# Patient Record
Sex: MALE | Race: WHITE | NOT HISPANIC OR LATINO | Employment: UNEMPLOYED | URBAN - METROPOLITAN AREA
[De-identification: names, ages, dates, MRNs, and addresses within clinical notes are randomized per-mention and may not be internally consistent; named-entity substitution may affect disease eponyms.]

---

## 2023-03-28 ENCOUNTER — TELEPHONE (OUTPATIENT)
Dept: PRIMARY CARE | Facility: CLINIC | Age: 58
End: 2023-03-28
Payer: COMMERCIAL

## 2023-03-28 RX ORDER — GLIMEPIRIDE 4 MG/1
4 TABLET ORAL DAILY
COMMUNITY
End: 2023-05-09 | Stop reason: SDUPTHER

## 2023-03-28 RX ORDER — ATORVASTATIN CALCIUM 80 MG/1
TABLET, FILM COATED ORAL
COMMUNITY
Start: 2022-06-24 | End: 2023-06-12 | Stop reason: ALTCHOICE

## 2023-03-28 RX ORDER — ESCITALOPRAM OXALATE 10 MG/1
TABLET ORAL
COMMUNITY
Start: 2023-02-27 | End: 2023-05-09 | Stop reason: ALTCHOICE

## 2023-03-28 RX ORDER — NITROGLYCERIN 0.4 MG/1
TABLET SUBLINGUAL
COMMUNITY
Start: 2021-11-19

## 2023-03-28 RX ORDER — ISOSORBIDE MONONITRATE 30 MG/1
30 TABLET, EXTENDED RELEASE ORAL DAILY
COMMUNITY
Start: 2022-05-02

## 2023-03-28 RX ORDER — MULTIVITAMIN
1 TABLET ORAL DAILY
COMMUNITY
Start: 2020-11-02 | End: 2023-05-09

## 2023-03-28 RX ORDER — AMLODIPINE BESYLATE 10 MG/1
TABLET ORAL
COMMUNITY
Start: 2022-06-24 | End: 2023-05-09 | Stop reason: ALTCHOICE

## 2023-03-28 RX ORDER — LISINOPRIL 40 MG/1
40 TABLET ORAL DAILY
COMMUNITY
End: 2023-05-02

## 2023-03-28 RX ORDER — HYDROXYZINE HYDROCHLORIDE 50 MG/1
TABLET, FILM COATED ORAL
COMMUNITY
End: 2023-05-31

## 2023-03-28 RX ORDER — MELOXICAM 15 MG/1
TABLET ORAL
COMMUNITY
Start: 2023-02-27 | End: 2023-04-19

## 2023-03-28 RX ORDER — ASPIRIN 81 MG/1
81 TABLET ORAL DAILY
COMMUNITY
End: 2023-05-02

## 2023-03-28 RX ORDER — CLOPIDOGREL BISULFATE 75 MG/1
75 TABLET ORAL DAILY
COMMUNITY
End: 2023-05-02

## 2023-03-28 RX ORDER — DAPAGLIFLOZIN 10 MG/1
10 TABLET, FILM COATED ORAL DAILY
COMMUNITY
End: 2023-04-19

## 2023-03-28 RX ORDER — TRIAMCINOLONE ACETONIDE OINTMENT USP, 0.05% 0.5 MG/G
OINTMENT TOPICAL
COMMUNITY
End: 2023-08-21 | Stop reason: SDUPTHER

## 2023-03-28 RX ORDER — BUPROPION HYDROCHLORIDE 100 MG/1
1 TABLET, EXTENDED RELEASE ORAL 2 TIMES DAILY
COMMUNITY
Start: 2023-03-15 | End: 2023-05-09

## 2023-03-28 RX ORDER — NEOMYCIN SULFATE, POLYMYXIN B SULFATE, HYDROCORTISONE 3.5; 10000; 1 MG/ML; [USP'U]/ML; MG/ML
SOLUTION/ DROPS AURICULAR (OTIC) 4 TIMES DAILY
COMMUNITY
Start: 2022-07-28 | End: 2023-05-09

## 2023-03-28 RX ORDER — ROSUVASTATIN CALCIUM 20 MG/1
20 TABLET, COATED ORAL DAILY
COMMUNITY
End: 2023-05-02

## 2023-03-28 RX ORDER — CHLORHEXIDINE GLUCONATE ORAL RINSE 1.2 MG/ML
SOLUTION DENTAL
COMMUNITY
Start: 2022-05-02 | End: 2023-05-09

## 2023-03-28 RX ORDER — SERTRALINE HYDROCHLORIDE 100 MG/1
1 TABLET, FILM COATED ORAL DAILY
COMMUNITY
Start: 2022-12-07 | End: 2023-05-09

## 2023-03-28 RX ORDER — LEVOTHYROXINE SODIUM 150 UG/1
150 TABLET ORAL DAILY
COMMUNITY
End: 2023-05-02

## 2023-03-28 RX ORDER — METOPROLOL SUCCINATE 100 MG/1
100 TABLET, EXTENDED RELEASE ORAL NIGHTLY
COMMUNITY
Start: 2023-02-27 | End: 2023-04-19

## 2023-03-28 RX ORDER — GABAPENTIN 100 MG/1
CAPSULE ORAL
COMMUNITY
Start: 2023-02-28 | End: 2023-05-02

## 2023-03-28 RX ORDER — TRAZODONE HYDROCHLORIDE 50 MG/1
50 TABLET ORAL NIGHTLY
COMMUNITY
Start: 2022-12-07

## 2023-03-28 NOTE — TELEPHONE ENCOUNTER
Pt having dental work done next week and dentist would like for him to go off blood thinners. How long can he go ?

## 2023-04-19 DIAGNOSIS — G89.29 OTHER CHRONIC PAIN: ICD-10-CM

## 2023-04-19 DIAGNOSIS — E11.10 DM (DIABETES MELLITUS) TYPE 2, UNCONTROLLED, WITH KETOACIDOSIS (MULTI): Primary | ICD-10-CM

## 2023-04-19 DIAGNOSIS — I10 ESSENTIAL (PRIMARY) HYPERTENSION: ICD-10-CM

## 2023-04-19 DIAGNOSIS — M25.512 PAIN IN LEFT SHOULDER: ICD-10-CM

## 2023-04-19 RX ORDER — DAPAGLIFLOZIN 10 MG/1
TABLET, FILM COATED ORAL
Qty: 30 TABLET | Refills: 1 | Status: SHIPPED | OUTPATIENT
Start: 2023-04-19 | End: 2023-05-31

## 2023-04-19 RX ORDER — METOPROLOL SUCCINATE 100 MG/1
TABLET, EXTENDED RELEASE ORAL
Qty: 90 TABLET | Refills: 1 | Status: SHIPPED | OUTPATIENT
Start: 2023-04-19 | End: 2023-05-09 | Stop reason: SDUPTHER

## 2023-04-19 RX ORDER — MELOXICAM 15 MG/1
TABLET ORAL
Qty: 30 TABLET | Refills: 1 | Status: SHIPPED | OUTPATIENT
Start: 2023-04-19 | End: 2023-05-02 | Stop reason: ALTCHOICE

## 2023-05-01 DIAGNOSIS — M54.9 DORSALGIA, UNSPECIFIED: ICD-10-CM

## 2023-05-01 DIAGNOSIS — E03.9 HYPOTHYROIDISM, UNSPECIFIED: ICD-10-CM

## 2023-05-01 DIAGNOSIS — I25.10 ATHEROSCLEROTIC HEART DISEASE OF NATIVE CORONARY ARTERY WITHOUT ANGINA PECTORIS: ICD-10-CM

## 2023-05-01 DIAGNOSIS — E11.49 TYPE 2 DIABETES MELLITUS WITH OTHER DIABETIC NEUROLOGICAL COMPLICATION (MULTI): ICD-10-CM

## 2023-05-01 DIAGNOSIS — G89.29 OTHER CHRONIC PAIN: ICD-10-CM

## 2023-05-01 DIAGNOSIS — I10 ESSENTIAL (PRIMARY) HYPERTENSION: ICD-10-CM

## 2023-05-02 DIAGNOSIS — E11.49 TYPE 2 DIABETES MELLITUS WITH OTHER DIABETIC NEUROLOGICAL COMPLICATION (MULTI): ICD-10-CM

## 2023-05-02 RX ORDER — ROSUVASTATIN CALCIUM 20 MG/1
TABLET, COATED ORAL
Qty: 10 TABLET | Refills: 0 | Status: SHIPPED | OUTPATIENT
Start: 2023-05-02 | End: 2023-05-09 | Stop reason: SDUPTHER

## 2023-05-02 RX ORDER — LISINOPRIL 40 MG/1
TABLET ORAL
Qty: 90 TABLET | Refills: 0 | Status: SHIPPED | OUTPATIENT
Start: 2023-05-02 | End: 2023-08-21 | Stop reason: SDUPTHER

## 2023-05-02 RX ORDER — LEVOTHYROXINE SODIUM 150 UG/1
TABLET ORAL
Qty: 90 TABLET | Refills: 0 | Status: SHIPPED | OUTPATIENT
Start: 2023-05-02 | End: 2023-08-21 | Stop reason: SDUPTHER

## 2023-05-02 RX ORDER — GABAPENTIN 100 MG/1
CAPSULE ORAL
Qty: 540 CAPSULE | Refills: 0 | Status: SHIPPED | OUTPATIENT
Start: 2023-05-02 | End: 2023-07-23 | Stop reason: SDUPTHER

## 2023-05-02 RX ORDER — ASPIRIN 81 MG/1
TABLET ORAL
Qty: 90 TABLET | Refills: 3 | Status: SHIPPED | OUTPATIENT
Start: 2023-05-02

## 2023-05-02 RX ORDER — ROSUVASTATIN CALCIUM 20 MG/1
TABLET, COATED ORAL
Qty: 1 TABLET | Refills: 0 | Status: SHIPPED | OUTPATIENT
Start: 2023-05-02 | End: 2023-05-02

## 2023-05-02 RX ORDER — CLOPIDOGREL BISULFATE 75 MG/1
TABLET ORAL
Qty: 90 TABLET | Refills: 3 | Status: SHIPPED | OUTPATIENT
Start: 2023-05-02

## 2023-05-09 ENCOUNTER — OFFICE VISIT (OUTPATIENT)
Dept: PRIMARY CARE | Facility: CLINIC | Age: 58
End: 2023-05-09
Payer: COMMERCIAL

## 2023-05-09 ENCOUNTER — LAB (OUTPATIENT)
Dept: LAB | Facility: LAB | Age: 58
End: 2023-05-09
Payer: COMMERCIAL

## 2023-05-09 VITALS
SYSTOLIC BLOOD PRESSURE: 130 MMHG | HEIGHT: 72 IN | DIASTOLIC BLOOD PRESSURE: 92 MMHG | HEART RATE: 83 BPM | BODY MASS INDEX: 31.15 KG/M2 | OXYGEN SATURATION: 95 % | RESPIRATION RATE: 18 BRPM | WEIGHT: 230 LBS

## 2023-05-09 DIAGNOSIS — E11.9 CONTROLLED TYPE 2 DIABETES MELLITUS WITHOUT COMPLICATION, UNSPECIFIED WHETHER LONG TERM INSULIN USE (MULTI): Primary | ICD-10-CM

## 2023-05-09 DIAGNOSIS — E11.49 TYPE 2 DIABETES MELLITUS WITH OTHER DIABETIC NEUROLOGICAL COMPLICATION (MULTI): ICD-10-CM

## 2023-05-09 DIAGNOSIS — E11.9 TYPE 2 DIABETES MELLITUS WITHOUT COMPLICATION, UNSPECIFIED WHETHER LONG TERM INSULIN USE (MULTI): ICD-10-CM

## 2023-05-09 DIAGNOSIS — Z91.199 NONCOMPLIANCE: ICD-10-CM

## 2023-05-09 DIAGNOSIS — R06.02 SHORT OF BREATH ON EXERTION: ICD-10-CM

## 2023-05-09 DIAGNOSIS — E11.9 DIABETES MELLITUS TYPE 2 WITHOUT RETINOPATHY (MULTI): ICD-10-CM

## 2023-05-09 DIAGNOSIS — E11.9 CONTROLLED TYPE 2 DIABETES MELLITUS WITHOUT COMPLICATION, WITHOUT LONG-TERM CURRENT USE OF INSULIN (MULTI): ICD-10-CM

## 2023-05-09 DIAGNOSIS — I10 ESSENTIAL (PRIMARY) HYPERTENSION: ICD-10-CM

## 2023-05-09 DIAGNOSIS — E11.9 CONTROLLED TYPE 2 DIABETES MELLITUS WITHOUT COMPLICATION, UNSPECIFIED WHETHER LONG TERM INSULIN USE (MULTI): ICD-10-CM

## 2023-05-09 DIAGNOSIS — R07.9 CHEST PAIN, UNSPECIFIED TYPE: ICD-10-CM

## 2023-05-09 DIAGNOSIS — F31.9 BIPOLAR 1 DISORDER (MULTI): ICD-10-CM

## 2023-05-09 PROBLEM — F41.9 ANXIETY: Status: ACTIVE | Noted: 2023-05-09

## 2023-05-09 PROBLEM — I20.9 AP (ANGINA PECTORIS) (CMS-HCC): Status: ACTIVE | Noted: 2023-05-09

## 2023-05-09 PROBLEM — G89.29 BACK PAIN, CHRONIC: Status: ACTIVE | Noted: 2023-05-09

## 2023-05-09 PROBLEM — N40.1 BENIGN PROSTATIC HYPERPLASIA WITH LOWER URINARY TRACT SYMPTOMS: Status: ACTIVE | Noted: 2019-08-03

## 2023-05-09 PROBLEM — D64.9 ANEMIA: Status: ACTIVE | Noted: 2023-05-09

## 2023-05-09 PROBLEM — R51.9 CHRONIC DAILY HEADACHE: Status: ACTIVE | Noted: 2023-05-09

## 2023-05-09 PROBLEM — I25.10 CAD (CORONARY ARTERY DISEASE), NATIVE CORONARY ARTERY: Status: ACTIVE | Noted: 2023-05-09

## 2023-05-09 PROBLEM — M54.9 BACK PAIN, CHRONIC: Status: ACTIVE | Noted: 2023-05-09

## 2023-05-09 PROCEDURE — 1036F TOBACCO NON-USER: CPT | Performed by: INTERNAL MEDICINE

## 2023-05-09 PROCEDURE — 80053 COMPREHEN METABOLIC PANEL: CPT

## 2023-05-09 PROCEDURE — 3075F SYST BP GE 130 - 139MM HG: CPT | Performed by: INTERNAL MEDICINE

## 2023-05-09 PROCEDURE — 82043 UR ALBUMIN QUANTITATIVE: CPT

## 2023-05-09 PROCEDURE — 84439 ASSAY OF FREE THYROXINE: CPT

## 2023-05-09 PROCEDURE — 84484 ASSAY OF TROPONIN QUANT: CPT

## 2023-05-09 PROCEDURE — 99214 OFFICE O/P EST MOD 30 MIN: CPT | Performed by: INTERNAL MEDICINE

## 2023-05-09 PROCEDURE — 82570 ASSAY OF URINE CREATININE: CPT

## 2023-05-09 PROCEDURE — 80061 LIPID PANEL: CPT

## 2023-05-09 PROCEDURE — 93000 ELECTROCARDIOGRAM COMPLETE: CPT | Performed by: INTERNAL MEDICINE

## 2023-05-09 PROCEDURE — 84443 ASSAY THYROID STIM HORMONE: CPT

## 2023-05-09 PROCEDURE — 85027 COMPLETE CBC AUTOMATED: CPT

## 2023-05-09 PROCEDURE — 4010F ACE/ARB THERAPY RXD/TAKEN: CPT | Performed by: INTERNAL MEDICINE

## 2023-05-09 PROCEDURE — 3080F DIAST BP >= 90 MM HG: CPT | Performed by: INTERNAL MEDICINE

## 2023-05-09 PROCEDURE — 83036 HEMOGLOBIN GLYCOSYLATED A1C: CPT

## 2023-05-09 PROCEDURE — 36415 COLL VENOUS BLD VENIPUNCTURE: CPT

## 2023-05-09 RX ORDER — METOPROLOL SUCCINATE 100 MG/1
TABLET, EXTENDED RELEASE ORAL
Qty: 90 TABLET | Refills: 1 | Status: SHIPPED | OUTPATIENT
Start: 2023-05-09 | End: 2023-05-09 | Stop reason: SDUPTHER

## 2023-05-09 RX ORDER — METOPROLOL SUCCINATE 100 MG/1
TABLET, EXTENDED RELEASE ORAL
Qty: 180 TABLET | Refills: 1 | Status: SHIPPED | OUTPATIENT
Start: 2023-05-09 | End: 2023-11-14 | Stop reason: SDUPTHER

## 2023-05-09 RX ORDER — GLIMEPIRIDE 4 MG/1
4 TABLET ORAL DAILY
Qty: 30 TABLET | Refills: 2 | Status: SHIPPED | OUTPATIENT
Start: 2023-05-09 | End: 2023-06-12 | Stop reason: SDUPTHER

## 2023-05-09 RX ORDER — ROSUVASTATIN CALCIUM 20 MG/1
20 TABLET, COATED ORAL DAILY
Qty: 90 TABLET | Refills: 1 | Status: SHIPPED | OUTPATIENT
Start: 2023-05-09 | End: 2023-06-12 | Stop reason: SDUPTHER

## 2023-05-09 RX ORDER — METOPROLOL SUCCINATE 100 MG/1
TABLET, EXTENDED RELEASE ORAL
Qty: 180 TABLET | Refills: 1 | Status: SHIPPED | OUTPATIENT
Start: 2023-05-09 | End: 2023-05-09 | Stop reason: SDUPTHER

## 2023-05-09 ASSESSMENT — ENCOUNTER SYMPTOMS
ENDOCRINE NEGATIVE: 1
DEPRESSION: 0
MUSCULOSKELETAL NEGATIVE: 1
SHORTNESS OF BREATH: 1
NEUROLOGICAL NEGATIVE: 1
PSYCHIATRIC NEGATIVE: 1
HEMATOLOGIC/LYMPHATIC NEGATIVE: 1
CHEST TIGHTNESS: 1
EYES NEGATIVE: 1
GASTROINTESTINAL NEGATIVE: 1
CONSTITUTIONAL NEGATIVE: 1

## 2023-05-09 ASSESSMENT — PATIENT HEALTH QUESTIONNAIRE - PHQ9
SUM OF ALL RESPONSES TO PHQ9 QUESTIONS 1 AND 2: 0
2. FEELING DOWN, DEPRESSED OR HOPELESS: NOT AT ALL
1. LITTLE INTEREST OR PLEASURE IN DOING THINGS: NOT AT ALL

## 2023-05-09 NOTE — PROGRESS NOTES
Subjective   Patient ID: Maurice Sterling is a 57 y.o. male who presents for Follow-up (Pt here for fuv, complains of SOB and chest pain x 1 week).  HPI      Struggling with  left  chest  arms  muscles  x 1 weeks  ago.  He  has  arthritis of the shoulder  and  has prob  with  lefting  he doesn't take   ntg.   It lasts  2 hours.   Ran out of glimepiride.  1 om    He has  hx of  bipolar.  Was given referral in sept  for  pscyh and he  never went.   He was [facility]  for  3 days: manic  episode. He was  crying a lot and laughing.   He didn't take the meds he got bec  they  gave him  headaches.   He stopped on his own.   He denies drug use.  He stopped 3  dasy he stresses.  Before he was drinking 3 x a week  2-12  beers .  He is  drinking  he cant sleep  he says his  shoudler hurt.     He has  stents from  2022.  Heart doc is     He  gave up weed , Bec of his lungs.   He is stressed about  finances.         Review of Systems   Constitutional: Negative.    HENT: Negative.     Eyes: Negative.    Respiratory:  Positive for chest tightness and shortness of breath.    Cardiovascular:  Positive for chest pain.   Gastrointestinal: Negative.    Endocrine: Negative.    Musculoskeletal: Negative.    Skin: Negative.    Neurological: Negative.    Hematological: Negative.    Psychiatric/Behavioral: Negative.     All other systems reviewed and are negative.      Objective   Physical Exam  Vitals and nursing note reviewed.   Constitutional:       Appearance: Normal appearance.   HENT:      Head: Normocephalic and atraumatic.      Right Ear: Tympanic membrane, ear canal and external ear normal.      Left Ear: Tympanic membrane, ear canal and external ear normal.      Nose: Nose normal.      Mouth/Throat:      Mouth: Mucous membranes are moist.      Pharynx: Oropharynx is clear.   Eyes:      Extraocular Movements: Extraocular movements intact.      Conjunctiva/sclera: Conjunctivae normal.      Pupils: Pupils are equal, round,  and reactive to light.   Cardiovascular:      Rate and Rhythm: Normal rate and regular rhythm.      Pulses: Normal pulses.      Heart sounds: Normal heart sounds.   Pulmonary:      Effort: Pulmonary effort is normal.      Breath sounds: Normal breath sounds.   Abdominal:      General: Abdomen is flat. Bowel sounds are normal.      Palpations: Abdomen is soft.   Musculoskeletal:         General: Normal range of motion.      Cervical back: Neck supple.   Skin:     General: Skin is warm and dry.      Capillary Refill: Capillary refill takes 2 to 3 seconds.   Neurological:      General: No focal deficit present.      Mental Status: He is alert and oriented to person, place, and time. Mental status is at baseline.   Psychiatric:         Mood and Affect: Mood normal.         Behavior: Behavior normal.         Thought Content: Thought content normal.         Judgment: Judgment normal.         Assessment/Plan   Problem List Items Addressed This Visit          Medium    Diabetes mellitus type 2 without retinopathy (CMS/[facility])    Diabetes mellitus type II, controlled (CMS/[facility]) - Primary    Relevant Medications    glimepiride (Amaryl) 4 mg tablet    Other Relevant Orders    Albumin , Urine Random    Hemoglobin A1C    Comprehensive Metabolic Panel    CBC    TSH with reflex to Free T4 if abnormal    Lipid Panel    Albumin , Urine Random    Bipolar 1 disorder (CMS/[facility])     Other Visit Diagnoses       Type 2 diabetes mellitus without complication, unspecified whether long term insulin use (CMS/[facility])        Relevant Orders    Comprehensive Metabolic Panel    CBC    Short of breath on exertion        Relevant Medications    metoprolol succinate XL (Toprol-XL) 100 mg 24 hr tablet    Other Relevant Orders    ECG 12 lead (Completed)    Troponin I, High Sensitivity    Chest pain, unspecified type        Noncompliance        Essential (primary) hypertension        Relevant Medications    metoprolol succinate XL (Toprol-XL) 100 mg 24 hr  tablet    Type 2 diabetes mellitus with other diabetic neurological complication (CMS/HCC)        Relevant Medications    rosuvastatin (Crestor) 20 mg tablet

## 2023-05-10 LAB
ALANINE AMINOTRANSFERASE (SGPT) (U/L) IN SER/PLAS: 36 U/L (ref 10–52)
ALBUMIN (G/DL) IN SER/PLAS: 4.7 G/DL (ref 3.4–5)
ALBUMIN (MG/L) IN URINE: 187.7 MG/L
ALBUMIN/CREATININE (UG/MG) IN URINE: 460 UG/MG CRT (ref 0–30)
ALKALINE PHOSPHATASE (U/L) IN SER/PLAS: 70 U/L (ref 33–120)
ANION GAP IN SER/PLAS: 17 MMOL/L (ref 10–20)
ASPARTATE AMINOTRANSFERASE (SGOT) (U/L) IN SER/PLAS: 27 U/L (ref 9–39)
BILIRUBIN TOTAL (MG/DL) IN SER/PLAS: 0.5 MG/DL (ref 0–1.2)
CALCIUM (MG/DL) IN SER/PLAS: 10.3 MG/DL (ref 8.6–10.6)
CARBON DIOXIDE, TOTAL (MMOL/L) IN SER/PLAS: 28 MMOL/L (ref 21–32)
CHLORIDE (MMOL/L) IN SER/PLAS: 97 MMOL/L (ref 98–107)
CHOLESTEROL (MG/DL) IN SER/PLAS: 378 MG/DL (ref 0–199)
CHOLESTEROL IN HDL (MG/DL) IN SER/PLAS: 39.5 MG/DL
CHOLESTEROL/HDL RATIO: 9.6
CREATININE (MG/DL) IN SER/PLAS: 1.19 MG/DL (ref 0.5–1.3)
CREATININE (MG/DL) IN URINE: 40.8 MG/DL (ref 20–370)
ERYTHROCYTE DISTRIBUTION WIDTH (RATIO) BY AUTOMATED COUNT: 12.8 % (ref 11.5–14.5)
ERYTHROCYTE MEAN CORPUSCULAR HEMOGLOBIN CONCENTRATION (G/DL) BY AUTOMATED: 32.8 G/DL (ref 32–36)
ERYTHROCYTE MEAN CORPUSCULAR VOLUME (FL) BY AUTOMATED COUNT: 87 FL (ref 80–100)
ERYTHROCYTES (10*6/UL) IN BLOOD BY AUTOMATED COUNT: 5.67 X10E12/L (ref 4.5–5.9)
ESTIMATED AVERAGE GLUCOSE FOR HBA1C: 240 MG/DL
GFR MALE: 70 ML/MIN/1.73M2
GLUCOSE (MG/DL) IN SER/PLAS: 266 MG/DL (ref 74–99)
HEMATOCRIT (%) IN BLOOD BY AUTOMATED COUNT: 49.4 % (ref 41–52)
HEMOGLOBIN (G/DL) IN BLOOD: 16.2 G/DL (ref 13.5–17.5)
HEMOGLOBIN A1C/HEMOGLOBIN TOTAL IN BLOOD: 10 %
LDL: ABNORMAL MG/DL (ref 0–99)
LEUKOCYTES (10*3/UL) IN BLOOD BY AUTOMATED COUNT: 5.8 X10E9/L (ref 4.4–11.3)
NRBC (PER 100 WBCS) BY AUTOMATED COUNT: 0 /100 WBC (ref 0–0)
PLATELETS (10*3/UL) IN BLOOD AUTOMATED COUNT: 151 X10E9/L (ref 150–450)
POTASSIUM (MMOL/L) IN SER/PLAS: 4.5 MMOL/L (ref 3.5–5.3)
PROTEIN TOTAL: 7.5 G/DL (ref 6.4–8.2)
SODIUM (MMOL/L) IN SER/PLAS: 137 MMOL/L (ref 136–145)
THYROTROPIN (MIU/L) IN SER/PLAS BY DETECTION LIMIT <= 0.05 MIU/L: 12.9 MIU/L (ref 0.44–3.98)
THYROXINE (T4) FREE (NG/DL) IN SER/PLAS: 1.07 NG/DL (ref 0.78–1.48)
TRIGLYCERIDE (MG/DL) IN SER/PLAS: 1883 MG/DL (ref 0–149)
TROPONIN I, HIGH SENSITIVITY: 4 NG/L (ref 0–53)
UREA NITROGEN (MG/DL) IN SER/PLAS: 23 MG/DL (ref 6–23)
VLDL: ABNORMAL MG/DL (ref 0–40)

## 2023-05-31 DIAGNOSIS — E11.10 DM (DIABETES MELLITUS) TYPE 2, UNCONTROLLED, WITH KETOACIDOSIS (MULTI): ICD-10-CM

## 2023-05-31 DIAGNOSIS — L40.9 PSORIASIS, UNSPECIFIED: ICD-10-CM

## 2023-05-31 DIAGNOSIS — G89.29 OTHER CHRONIC PAIN: ICD-10-CM

## 2023-05-31 DIAGNOSIS — M25.512 PAIN IN LEFT SHOULDER: ICD-10-CM

## 2023-05-31 RX ORDER — HYDROXYZINE HYDROCHLORIDE 50 MG/1
TABLET, FILM COATED ORAL
Qty: 30 TABLET | Refills: 6 | Status: SHIPPED | OUTPATIENT
Start: 2023-05-31

## 2023-05-31 RX ORDER — DAPAGLIFLOZIN 10 MG/1
TABLET, FILM COATED ORAL
Qty: 30 TABLET | Refills: 2 | Status: SHIPPED | OUTPATIENT
Start: 2023-05-31 | End: 2023-09-20

## 2023-05-31 RX ORDER — MELOXICAM 15 MG/1
TABLET ORAL
Qty: 30 TABLET | Refills: 1 | OUTPATIENT
Start: 2023-05-31

## 2023-06-12 ENCOUNTER — OFFICE VISIT (OUTPATIENT)
Dept: PRIMARY CARE | Facility: CLINIC | Age: 58
End: 2023-06-12
Payer: COMMERCIAL

## 2023-06-12 VITALS
OXYGEN SATURATION: 94 % | SYSTOLIC BLOOD PRESSURE: 137 MMHG | BODY MASS INDEX: 31.15 KG/M2 | HEIGHT: 72 IN | HEART RATE: 93 BPM | DIASTOLIC BLOOD PRESSURE: 92 MMHG | WEIGHT: 230 LBS | RESPIRATION RATE: 18 BRPM

## 2023-06-12 DIAGNOSIS — M62.830 BACK MUSCLE SPASM: ICD-10-CM

## 2023-06-12 DIAGNOSIS — K59.00 OBSTIPATION: ICD-10-CM

## 2023-06-12 DIAGNOSIS — E11.9 CONTROLLED TYPE 2 DIABETES MELLITUS WITHOUT COMPLICATION, UNSPECIFIED WHETHER LONG TERM INSULIN USE (MULTI): ICD-10-CM

## 2023-06-12 DIAGNOSIS — E11.49 TYPE 2 DIABETES MELLITUS WITH OTHER DIABETIC NEUROLOGICAL COMPLICATION (MULTI): Primary | ICD-10-CM

## 2023-06-12 PROCEDURE — 1036F TOBACCO NON-USER: CPT | Performed by: INTERNAL MEDICINE

## 2023-06-12 PROCEDURE — 3080F DIAST BP >= 90 MM HG: CPT | Performed by: INTERNAL MEDICINE

## 2023-06-12 PROCEDURE — 3075F SYST BP GE 130 - 139MM HG: CPT | Performed by: INTERNAL MEDICINE

## 2023-06-12 PROCEDURE — 3046F HEMOGLOBIN A1C LEVEL >9.0%: CPT | Performed by: INTERNAL MEDICINE

## 2023-06-12 PROCEDURE — 4010F ACE/ARB THERAPY RXD/TAKEN: CPT | Performed by: INTERNAL MEDICINE

## 2023-06-12 PROCEDURE — 99214 OFFICE O/P EST MOD 30 MIN: CPT | Performed by: INTERNAL MEDICINE

## 2023-06-12 RX ORDER — ROSUVASTATIN CALCIUM 20 MG/1
40 TABLET, COATED ORAL DAILY
Qty: 90 TABLET | Refills: 1 | Status: SHIPPED | OUTPATIENT
Start: 2023-06-12 | End: 2023-09-20

## 2023-06-12 RX ORDER — MELOXICAM 15 MG/1
15 TABLET ORAL
COMMUNITY
Start: 2023-05-26 | End: 2023-06-26

## 2023-06-12 RX ORDER — TIZANIDINE 2 MG/1
2 TABLET ORAL NIGHTLY PRN
Qty: 30 TABLET | Refills: 2 | Status: SHIPPED | OUTPATIENT
Start: 2023-06-12 | End: 2023-08-21 | Stop reason: SDUPTHER

## 2023-06-12 RX ORDER — GLIMEPIRIDE 4 MG/1
TABLET ORAL
Qty: 30 TABLET | Refills: 2 | Status: SHIPPED | OUTPATIENT
Start: 2023-06-12 | End: 2023-08-21 | Stop reason: SDUPTHER

## 2023-06-12 RX ORDER — BENZONATATE 100 MG/1
100 CAPSULE ORAL 3 TIMES DAILY PRN
COMMUNITY
Start: 2023-05-18

## 2023-06-12 ASSESSMENT — ENCOUNTER SYMPTOMS
CARDIOVASCULAR NEGATIVE: 1
NEUROLOGICAL NEGATIVE: 1
GASTROINTESTINAL NEGATIVE: 1
EYES NEGATIVE: 1
MUSCULOSKELETAL NEGATIVE: 1
RESPIRATORY NEGATIVE: 1
PSYCHIATRIC NEGATIVE: 1
CONSTITUTIONAL NEGATIVE: 1
HEMATOLOGIC/LYMPHATIC NEGATIVE: 1
ENDOCRINE NEGATIVE: 1
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
## 2023-06-26 DIAGNOSIS — G89.29 OTHER CHRONIC PAIN: ICD-10-CM

## 2023-06-26 DIAGNOSIS — M25.512 PAIN IN LEFT SHOULDER: ICD-10-CM

## 2023-06-26 RX ORDER — MELOXICAM 15 MG/1
TABLET ORAL
Qty: 30 TABLET | Refills: 1 | Status: SHIPPED | OUTPATIENT
Start: 2023-06-26 | End: 2023-08-21 | Stop reason: SDUPTHER

## 2023-07-22 DIAGNOSIS — G89.29 OTHER CHRONIC PAIN: ICD-10-CM

## 2023-07-22 DIAGNOSIS — M54.9 DORSALGIA, UNSPECIFIED: ICD-10-CM

## 2023-07-23 RX ORDER — GABAPENTIN 100 MG/1
CAPSULE ORAL
Qty: 540 CAPSULE | Refills: 0 | Status: SHIPPED | OUTPATIENT
Start: 2023-07-23 | End: 2023-09-20

## 2023-08-02 DIAGNOSIS — G89.29 OTHER CHRONIC PAIN: ICD-10-CM

## 2023-08-02 DIAGNOSIS — E03.9 HYPOTHYROIDISM, UNSPECIFIED: ICD-10-CM

## 2023-08-02 DIAGNOSIS — E11.9 CONTROLLED TYPE 2 DIABETES MELLITUS WITHOUT COMPLICATION, UNSPECIFIED WHETHER LONG TERM INSULIN USE (MULTI): ICD-10-CM

## 2023-08-02 DIAGNOSIS — I10 ESSENTIAL (PRIMARY) HYPERTENSION: ICD-10-CM

## 2023-08-02 DIAGNOSIS — M25.512 PAIN IN LEFT SHOULDER: ICD-10-CM

## 2023-08-02 DIAGNOSIS — M62.830 BACK MUSCLE SPASM: ICD-10-CM

## 2023-08-02 RX ORDER — TIZANIDINE 2 MG/1
2 TABLET ORAL NIGHTLY PRN
Qty: 30 TABLET | Refills: 2 | OUTPATIENT
Start: 2023-08-02

## 2023-08-02 RX ORDER — GLIMEPIRIDE 4 MG/1
TABLET ORAL
Qty: 30 TABLET | Refills: 2 | OUTPATIENT
Start: 2023-08-02

## 2023-08-02 RX ORDER — LEVOTHYROXINE SODIUM 150 UG/1
TABLET ORAL
Qty: 90 TABLET | Refills: 0 | OUTPATIENT
Start: 2023-08-02

## 2023-08-02 RX ORDER — LISINOPRIL 40 MG/1
TABLET ORAL
Qty: 90 TABLET | Refills: 0 | OUTPATIENT
Start: 2023-08-02

## 2023-08-02 RX ORDER — MELOXICAM 15 MG/1
TABLET ORAL
Qty: 30 TABLET | Refills: 2 | OUTPATIENT
Start: 2023-08-02

## 2023-08-21 ENCOUNTER — HOSPITAL ENCOUNTER (OUTPATIENT)
Dept: DATA CONVERSION | Facility: HOSPITAL | Age: 58
Discharge: HOME | End: 2023-08-21
Payer: COMMERCIAL

## 2023-08-21 DIAGNOSIS — E03.9 HYPOTHYROIDISM, UNSPECIFIED: ICD-10-CM

## 2023-08-21 DIAGNOSIS — M62.830 BACK MUSCLE SPASM: ICD-10-CM

## 2023-08-21 DIAGNOSIS — E11.9 CONTROLLED TYPE 2 DIABETES MELLITUS WITHOUT COMPLICATION, UNSPECIFIED WHETHER LONG TERM INSULIN USE (MULTI): ICD-10-CM

## 2023-08-21 DIAGNOSIS — I10 ESSENTIAL (PRIMARY) HYPERTENSION: ICD-10-CM

## 2023-08-21 DIAGNOSIS — L30.9 DERMATITIS: Primary | ICD-10-CM

## 2023-08-21 DIAGNOSIS — G89.29 OTHER CHRONIC PAIN: ICD-10-CM

## 2023-08-21 DIAGNOSIS — K59.00 CONSTIPATION, UNSPECIFIED: ICD-10-CM

## 2023-08-21 DIAGNOSIS — M25.512 PAIN IN LEFT SHOULDER: ICD-10-CM

## 2023-08-21 RX ORDER — LISINOPRIL 40 MG/1
40 TABLET ORAL DAILY
Qty: 30 TABLET | Refills: 0 | Status: SHIPPED | OUTPATIENT
Start: 2023-08-21 | End: 2023-09-20

## 2023-08-21 RX ORDER — MELOXICAM 15 MG/1
15 TABLET ORAL
Qty: 30 TABLET | Refills: 0 | Status: SHIPPED | OUTPATIENT
Start: 2023-08-21 | End: 2023-09-20

## 2023-08-21 RX ORDER — TRIAMCINOLONE ACETONIDE OINTMENT USP, 0.05% 0.5 MG/G
OINTMENT TOPICAL
Qty: 100 G | Refills: 3 | Status: SHIPPED | OUTPATIENT
Start: 2023-08-21 | End: 2023-09-20

## 2023-08-21 RX ORDER — LEVOTHYROXINE SODIUM 150 UG/1
150 TABLET ORAL DAILY
Qty: 90 TABLET | Refills: 0 | Status: SHIPPED | OUTPATIENT
Start: 2023-08-21 | End: 2023-11-24

## 2023-08-21 RX ORDER — GLIMEPIRIDE 4 MG/1
TABLET ORAL
Qty: 30 TABLET | Refills: 0 | Status: SHIPPED | OUTPATIENT
Start: 2023-08-21 | End: 2023-09-20

## 2023-08-21 RX ORDER — TIZANIDINE 2 MG/1
2 TABLET ORAL NIGHTLY PRN
Qty: 30 TABLET | Refills: 0 | Status: SHIPPED | OUTPATIENT
Start: 2023-08-21 | End: 2023-09-20

## 2023-08-25 ENCOUNTER — TELEPHONE (OUTPATIENT)
Dept: PRIMARY CARE | Facility: CLINIC | Age: 58
End: 2023-08-25
Payer: COMMERCIAL

## 2023-09-19 DIAGNOSIS — M54.9 DORSALGIA, UNSPECIFIED: ICD-10-CM

## 2023-09-19 DIAGNOSIS — G89.29 OTHER CHRONIC PAIN: ICD-10-CM

## 2023-09-19 DIAGNOSIS — I10 ESSENTIAL (PRIMARY) HYPERTENSION: ICD-10-CM

## 2023-09-19 DIAGNOSIS — E11.10 DM (DIABETES MELLITUS) TYPE 2, UNCONTROLLED, WITH KETOACIDOSIS (MULTI): ICD-10-CM

## 2023-09-19 DIAGNOSIS — M62.830 BACK MUSCLE SPASM: ICD-10-CM

## 2023-09-19 DIAGNOSIS — M25.512 PAIN IN LEFT SHOULDER: ICD-10-CM

## 2023-09-19 DIAGNOSIS — E11.9 CONTROLLED TYPE 2 DIABETES MELLITUS WITHOUT COMPLICATION, UNSPECIFIED WHETHER LONG TERM INSULIN USE (MULTI): ICD-10-CM

## 2023-09-19 DIAGNOSIS — E11.49 TYPE 2 DIABETES MELLITUS WITH OTHER DIABETIC NEUROLOGICAL COMPLICATION (MULTI): ICD-10-CM

## 2023-09-19 DIAGNOSIS — L30.9 DERMATITIS: ICD-10-CM

## 2023-09-20 ENCOUNTER — TELEPHONE (OUTPATIENT)
Dept: PRIMARY CARE | Facility: CLINIC | Age: 58
End: 2023-09-20
Payer: COMMERCIAL

## 2023-09-20 DIAGNOSIS — G89.29 OTHER CHRONIC PAIN: ICD-10-CM

## 2023-09-20 DIAGNOSIS — M25.512 PAIN IN LEFT SHOULDER: ICD-10-CM

## 2023-09-20 RX ORDER — GLIMEPIRIDE 4 MG/1
TABLET ORAL
Qty: 30 TABLET | Refills: 0 | Status: SHIPPED | OUTPATIENT
Start: 2023-09-20 | End: 2023-11-03 | Stop reason: SDUPTHER

## 2023-09-20 RX ORDER — ROSUVASTATIN CALCIUM 20 MG/1
TABLET, COATED ORAL
Qty: 90 TABLET | Refills: 0 | Status: SHIPPED | OUTPATIENT
Start: 2023-09-20 | End: 2023-11-03 | Stop reason: SDUPTHER

## 2023-09-20 RX ORDER — DAPAGLIFLOZIN 10 MG/1
TABLET, FILM COATED ORAL
Qty: 30 TABLET | Refills: 0 | Status: SHIPPED | OUTPATIENT
Start: 2023-09-20 | End: 2023-11-03 | Stop reason: SDUPTHER

## 2023-09-20 RX ORDER — GABAPENTIN 100 MG/1
CAPSULE ORAL
Qty: 540 CAPSULE | Refills: 0 | Status: SHIPPED | OUTPATIENT
Start: 2023-09-20 | End: 2023-11-24

## 2023-09-20 RX ORDER — LISINOPRIL 40 MG/1
40 TABLET ORAL DAILY
Qty: 30 TABLET | Refills: 0 | Status: SHIPPED | OUTPATIENT
Start: 2023-09-20 | End: 2023-11-03 | Stop reason: SDUPTHER

## 2023-09-20 RX ORDER — MELOXICAM 15 MG/1
15 TABLET ORAL
Qty: 30 TABLET | Refills: 0 | Status: SHIPPED | OUTPATIENT
Start: 2023-09-20 | End: 2023-10-20

## 2023-09-20 RX ORDER — TIZANIDINE 2 MG/1
2 TABLET ORAL NIGHTLY PRN
Qty: 30 TABLET | Refills: 0 | Status: SHIPPED | OUTPATIENT
Start: 2023-09-20 | End: 2023-11-03 | Stop reason: SDUPTHER

## 2023-09-20 RX ORDER — TRIAMCINOLONE ACETONIDE OINTMENT USP, 0.05% 0.5 MG/G
OINTMENT TOPICAL
Qty: 430 G | Refills: 0 | Status: SHIPPED | OUTPATIENT
Start: 2023-09-20

## 2023-10-10 ENCOUNTER — APPOINTMENT (OUTPATIENT)
Dept: RADIOLOGY | Facility: HOSPITAL | Age: 58
End: 2023-10-10
Payer: COMMERCIAL

## 2023-10-10 ENCOUNTER — HOSPITAL ENCOUNTER (EMERGENCY)
Facility: HOSPITAL | Age: 58
Discharge: HOME | End: 2023-10-10
Payer: COMMERCIAL

## 2023-10-10 VITALS
TEMPERATURE: 97.5 F | WEIGHT: 242.51 LBS | HEIGHT: 72 IN | BODY MASS INDEX: 32.85 KG/M2 | RESPIRATION RATE: 15 BRPM | HEART RATE: 89 BPM | DIASTOLIC BLOOD PRESSURE: 101 MMHG | SYSTOLIC BLOOD PRESSURE: 151 MMHG | OXYGEN SATURATION: 99 %

## 2023-10-10 DIAGNOSIS — R07.9 CHEST PAIN, UNSPECIFIED TYPE: Primary | ICD-10-CM

## 2023-10-10 LAB
ANION GAP SERPL CALC-SCNC: 15 MMOL/L
BASOPHILS # BLD AUTO: 0.02 X10*3/UL (ref 0–0.1)
BASOPHILS NFR BLD AUTO: 0.5 %
BUN SERPL-MCNC: 22 MG/DL (ref 8–25)
CALCIUM SERPL-MCNC: 9.4 MG/DL (ref 8.5–10.4)
CHLORIDE SERPL-SCNC: 100 MMOL/L (ref 97–107)
CO2 SERPL-SCNC: 21 MMOL/L (ref 24–31)
CREAT SERPL-MCNC: 1.1 MG/DL (ref 0.4–1.6)
EOSINOPHIL # BLD AUTO: 0.15 X10*3/UL (ref 0–0.7)
EOSINOPHIL NFR BLD AUTO: 4 %
ERYTHROCYTE [DISTWIDTH] IN BLOOD BY AUTOMATED COUNT: 13.2 % (ref 11.5–14.5)
GFR SERPL CREATININE-BSD FRML MDRD: 77 ML/MIN/1.73M*2
GLUCOSE SERPL-MCNC: 272 MG/DL (ref 65–99)
HCT VFR BLD AUTO: 48.4 % (ref 41–52)
HGB BLD-MCNC: 16.1 G/DL (ref 13.5–17.5)
IMM GRANULOCYTES # BLD AUTO: 0.03 X10*3/UL (ref 0–0.7)
IMM GRANULOCYTES NFR BLD AUTO: 0.8 % (ref 0–0.9)
LYMPHOCYTES # BLD AUTO: 0.78 X10*3/UL (ref 1.2–4.8)
LYMPHOCYTES NFR BLD AUTO: 20.7 %
MCH RBC QN AUTO: 29 PG (ref 26–34)
MCHC RBC AUTO-ENTMCNC: 33.3 G/DL (ref 32–36)
MCV RBC AUTO: 87 FL (ref 80–100)
MONOCYTES # BLD AUTO: 0.74 X10*3/UL (ref 0.1–1)
MONOCYTES NFR BLD AUTO: 19.6 %
NEUTROPHILS # BLD AUTO: 2.05 X10*3/UL (ref 1.2–7.7)
NEUTROPHILS NFR BLD AUTO: 54.4 %
NRBC BLD-RTO: 0 /100 WBCS (ref 0–0)
NT-PROBNP SERPL-MCNC: 45 PG/ML (ref 0–177)
PLATELET # BLD AUTO: 106 X10*3/UL (ref 150–450)
PMV BLD AUTO: 11.3 FL (ref 7.5–11.5)
POTASSIUM SERPL-SCNC: 4 MMOL/L (ref 3.4–5.1)
RBC # BLD AUTO: 5.55 X10*6/UL (ref 4.5–5.9)
RBC MORPH BLD: NORMAL
SODIUM SERPL-SCNC: 136 MMOL/L (ref 133–145)
TROPONIN T SERPL-MCNC: 10 NG/L
TROPONIN T SERPL-MCNC: 9 NG/L
WBC # BLD AUTO: 3.8 X10*3/UL (ref 4.4–11.3)

## 2023-10-10 PROCEDURE — 2550000001 HC RX 255 CONTRASTS: Performed by: EMERGENCY MEDICINE

## 2023-10-10 PROCEDURE — 96374 THER/PROPH/DIAG INJ IV PUSH: CPT

## 2023-10-10 PROCEDURE — 36415 COLL VENOUS BLD VENIPUNCTURE: CPT | Performed by: EMERGENCY MEDICINE

## 2023-10-10 PROCEDURE — 80048 BASIC METABOLIC PNL TOTAL CA: CPT | Performed by: EMERGENCY MEDICINE

## 2023-10-10 PROCEDURE — 96361 HYDRATE IV INFUSION ADD-ON: CPT

## 2023-10-10 PROCEDURE — 85025 COMPLETE CBC W/AUTO DIFF WBC: CPT | Performed by: EMERGENCY MEDICINE

## 2023-10-10 PROCEDURE — 83880 ASSAY OF NATRIURETIC PEPTIDE: CPT | Performed by: EMERGENCY MEDICINE

## 2023-10-10 PROCEDURE — 84484 ASSAY OF TROPONIN QUANT: CPT | Mod: 91 | Performed by: EMERGENCY MEDICINE

## 2023-10-10 PROCEDURE — 84484 ASSAY OF TROPONIN QUANT: CPT | Performed by: EMERGENCY MEDICINE

## 2023-10-10 PROCEDURE — 99284 EMERGENCY DEPT VISIT MOD MDM: CPT | Mod: 25

## 2023-10-10 PROCEDURE — 2500000004 HC RX 250 GENERAL PHARMACY W/ HCPCS (ALT 636 FOR OP/ED): Performed by: EMERGENCY MEDICINE

## 2023-10-10 RX ADMIN — SODIUM CHLORIDE 500 ML: 900 INJECTION, SOLUTION INTRAVENOUS at 19:44

## 2023-10-10 RX ADMIN — IOHEXOL 75 ML: 350 INJECTION, SOLUTION INTRAVENOUS at 20:15

## 2023-10-10 ASSESSMENT — COLUMBIA-SUICIDE SEVERITY RATING SCALE - C-SSRS
2. HAVE YOU ACTUALLY HAD ANY THOUGHTS OF KILLING YOURSELF?: NO
1. IN THE PAST MONTH, HAVE YOU WISHED YOU WERE DEAD OR WISHED YOU COULD GO TO SLEEP AND NOT WAKE UP?: NO
6. HAVE YOU EVER DONE ANYTHING, STARTED TO DO ANYTHING, OR PREPARED TO DO ANYTHING TO END YOUR LIFE?: NO

## 2023-10-10 ASSESSMENT — HEART SCORE
AGE: 45-64
TROPONIN: LESS THAN OR EQUAL TO NORMAL LIMIT
ECG: NORMAL
HISTORY: SLIGHTLY SUSPICIOUS
HEART SCORE: 3
RISK FACTORS: >2 RISK FACTORS OR HX OF ATHEROSCLEROTIC DISEASE

## 2023-10-10 ASSESSMENT — PAIN SCALES - GENERAL: PAINLEVEL_OUTOF10: 7

## 2023-10-10 ASSESSMENT — PAIN - FUNCTIONAL ASSESSMENT: PAIN_FUNCTIONAL_ASSESSMENT: 0-10
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
## 2023-10-18 ENCOUNTER — HOSPITAL ENCOUNTER (OUTPATIENT)
Dept: CARDIOLOGY | Facility: HOSPITAL | Age: 58
Discharge: HOME | End: 2023-10-18
Payer: COMMERCIAL

## 2023-10-18 LAB
ATRIAL RATE: 85 BPM
P AXIS: 36 DEGREES
P OFFSET: 199 MS
P ONSET: 143 MS
PR INTERVAL: 162 MS
Q ONSET: 224 MS
QRS COUNT: 14 BEATS
QRS DURATION: 94 MS
QT INTERVAL: 380 MS
QTC CALCULATION(BAZETT): 452 MS
QTC FREDERICIA: 426 MS
R AXIS: 24 DEGREES
T AXIS: 69 DEGREES
T OFFSET: 414 MS
VENTRICULAR RATE: 85 BPM

## 2023-10-18 PROCEDURE — 93005 ELECTROCARDIOGRAM TRACING: CPT

## 2023-10-29 DIAGNOSIS — E11.9 DIABETES MELLITUS TYPE 2 WITHOUT RETINOPATHY (MULTI): Primary | ICD-10-CM

## 2023-10-30 DIAGNOSIS — E11.9 DIABETES MELLITUS TYPE 2 WITHOUT RETINOPATHY (MULTI): Primary | ICD-10-CM

## 2023-11-03 ENCOUNTER — OFFICE VISIT (OUTPATIENT)
Dept: PRIMARY CARE | Facility: CLINIC | Age: 58
End: 2023-11-03
Payer: COMMERCIAL

## 2023-11-03 VITALS
WEIGHT: 238 LBS | TEMPERATURE: 98.6 F | DIASTOLIC BLOOD PRESSURE: 100 MMHG | BODY MASS INDEX: 32.23 KG/M2 | HEART RATE: 90 BPM | HEIGHT: 72 IN | SYSTOLIC BLOOD PRESSURE: 158 MMHG | OXYGEN SATURATION: 94 %

## 2023-11-03 DIAGNOSIS — R35.0 BENIGN PROSTATIC HYPERPLASIA WITH URINARY FREQUENCY: ICD-10-CM

## 2023-11-03 DIAGNOSIS — I10 ESSENTIAL (PRIMARY) HYPERTENSION: ICD-10-CM

## 2023-11-03 DIAGNOSIS — E11.9 DIABETES MELLITUS TYPE 2 WITHOUT RETINOPATHY (MULTI): Primary | ICD-10-CM

## 2023-11-03 DIAGNOSIS — M62.830 BACK MUSCLE SPASM: ICD-10-CM

## 2023-11-03 DIAGNOSIS — F41.9 ANXIETY: ICD-10-CM

## 2023-11-03 DIAGNOSIS — E11.10 DM (DIABETES MELLITUS) TYPE 2, UNCONTROLLED, WITH KETOACIDOSIS (MULTI): ICD-10-CM

## 2023-11-03 DIAGNOSIS — I25.118 CORONARY ARTERY DISEASE OF NATIVE ARTERY OF NATIVE HEART WITH STABLE ANGINA PECTORIS (CMS-HCC): ICD-10-CM

## 2023-11-03 DIAGNOSIS — E11.49 TYPE 2 DIABETES MELLITUS WITH OTHER DIABETIC NEUROLOGICAL COMPLICATION (MULTI): ICD-10-CM

## 2023-11-03 DIAGNOSIS — N40.1 BENIGN PROSTATIC HYPERPLASIA WITH URINARY FREQUENCY: ICD-10-CM

## 2023-11-03 DIAGNOSIS — R91.1 LUNG NODULE: ICD-10-CM

## 2023-11-03 DIAGNOSIS — F31.9 BIPOLAR 1 DISORDER (MULTI): ICD-10-CM

## 2023-11-03 DIAGNOSIS — E11.9 CONTROLLED TYPE 2 DIABETES MELLITUS WITHOUT COMPLICATION, UNSPECIFIED WHETHER LONG TERM INSULIN USE (MULTI): ICD-10-CM

## 2023-11-03 LAB — POC HEMOGLOBIN A1C: 9 % (ref 4.2–6.5)

## 2023-11-03 PROCEDURE — 1036F TOBACCO NON-USER: CPT | Performed by: INTERNAL MEDICINE

## 2023-11-03 PROCEDURE — 4010F ACE/ARB THERAPY RXD/TAKEN: CPT | Performed by: INTERNAL MEDICINE

## 2023-11-03 PROCEDURE — 83036 HEMOGLOBIN GLYCOSYLATED A1C: CPT | Performed by: INTERNAL MEDICINE

## 2023-11-03 PROCEDURE — 99213 OFFICE O/P EST LOW 20 MIN: CPT | Performed by: INTERNAL MEDICINE

## 2023-11-03 PROCEDURE — 3046F HEMOGLOBIN A1C LEVEL >9.0%: CPT | Performed by: INTERNAL MEDICINE

## 2023-11-03 PROCEDURE — 3080F DIAST BP >= 90 MM HG: CPT | Performed by: INTERNAL MEDICINE

## 2023-11-03 PROCEDURE — 3077F SYST BP >= 140 MM HG: CPT | Performed by: INTERNAL MEDICINE

## 2023-11-03 RX ORDER — MELOXICAM 15 MG/1
15 TABLET ORAL DAILY
Qty: 90 TABLET | Refills: 0 | Status: SHIPPED | OUTPATIENT
Start: 2023-11-03 | End: 2024-11-02

## 2023-11-03 RX ORDER — LISINOPRIL 40 MG/1
40 TABLET ORAL DAILY
Qty: 30 TABLET | Refills: 0 | Status: SHIPPED | OUTPATIENT
Start: 2023-11-03 | End: 2023-11-12 | Stop reason: SDUPTHER

## 2023-11-03 RX ORDER — DULAGLUTIDE 0.75 MG/.5ML
0.75 INJECTION, SOLUTION SUBCUTANEOUS
Qty: 2 ML | Refills: 2 | Status: SHIPPED | OUTPATIENT
Start: 2023-11-03 | End: 2023-11-22 | Stop reason: HOSPADM

## 2023-11-03 RX ORDER — GLIMEPIRIDE 4 MG/1
4 TABLET ORAL
Qty: 180 TABLET | Refills: 1 | Status: SHIPPED | OUTPATIENT
Start: 2023-11-03 | End: 2024-01-17 | Stop reason: DRUGHIGH

## 2023-11-03 RX ORDER — ROSUVASTATIN CALCIUM 20 MG/1
20 TABLET, COATED ORAL DAILY
Qty: 90 TABLET | Refills: 1 | Status: SHIPPED | OUTPATIENT
Start: 2023-11-03 | End: 2024-11-02

## 2023-11-03 RX ORDER — PANTOPRAZOLE SODIUM 20 MG/1
20 TABLET, DELAYED RELEASE ORAL DAILY
Qty: 90 TABLET | Refills: 1 | Status: SHIPPED | OUTPATIENT
Start: 2023-11-03 | End: 2024-11-02

## 2023-11-03 RX ORDER — TIZANIDINE 2 MG/1
2 TABLET ORAL NIGHTLY PRN
Qty: 90 TABLET | Refills: 0 | Status: SHIPPED | OUTPATIENT
Start: 2023-11-03 | End: 2024-02-01

## 2023-11-03 RX ORDER — DAPAGLIFLOZIN 10 MG/1
10 TABLET, FILM COATED ORAL DAILY
Qty: 90 TABLET | Refills: 1 | Status: SHIPPED | OUTPATIENT
Start: 2023-11-03 | End: 2024-11-02

## 2023-11-03 ASSESSMENT — PAIN SCALES - GENERAL: PAINLEVEL: 0-NO PAIN

## 2023-11-03 ASSESSMENT — LIFESTYLE VARIABLES
HOW OFTEN DO YOU HAVE A DRINK CONTAINING ALCOHOL: MONTHLY OR LESS
HOW OFTEN DO YOU HAVE SIX OR MORE DRINKS ON ONE OCCASION: LESS THAN MONTHLY
SKIP TO QUESTIONS 9-10: 0
AUDIT-C TOTAL SCORE: 2
HOW MANY STANDARD DRINKS CONTAINING ALCOHOL DO YOU HAVE ON A TYPICAL DAY: 1 OR 2

## 2023-11-03 ASSESSMENT — COLUMBIA-SUICIDE SEVERITY RATING SCALE - C-SSRS: 1. IN THE PAST MONTH, HAVE YOU WISHED YOU WERE DEAD OR WISHED YOU COULD GO TO SLEEP AND NOT WAKE UP?: NO

## 2023-11-03 NOTE — PROGRESS NOTES
Subjective   Patient ID: Maurice Sterling is a 57 y.o. male who presents for Med Refill.    HPI     Review of Systems    Objective   BP (!) 158/100   Pulse 90   Temp 37 °C (98.6 °F)   Ht 1.829 m (6')   Wt 108 kg (238 lb)   SpO2 94%   BMI 32.28 kg/m²     Physical Exam    Assessment/Plan

## 2023-11-03 NOTE — PROGRESS NOTES
Subjective   Patient ID: Maurice Sterling is a 57 y.o. male who presents for Med Refill.    HPI patient presents to clinic as follow-up visit from [facility] emergency room regarding chest pain.  He was worked up there with routine blood work which revealed serum glucose of 272, bicarb of 21, hemoglobin of 16.1 platelets of 106 WBC of 3.8 and other studies were unremarkable.  CTA chest revealed ectasia of ascending aorta measuring 4.1 cm, severe liver steatosis cholelithiasis and left upper lobe lung nodule measuring 11 x 10 x 8 mm .Hba1c is 9.0 done in the office.he has history of h/o psoriasis,htn,obesity,psoriasis,sciatica,bipolar disorder, alcohol dependence, obesity, coronary artery disease with stent insertion, type 2 diabetes, dyslipidemia, chronic back pain, hypothyroidism, severe hepatic steatosis, thrombocytopenia and anxiety disorder.    Review of Systems   Constitutional: Negative.    HENT: Negative.     Eyes: Negative.    Respiratory: Negative.     Cardiovascular: Negative.    Gastrointestinal: Negative.    Endocrine: Negative.    Genitourinary: Negative.    Musculoskeletal: Negative.    Skin: Negative.    Allergic/Immunologic: Negative.    Neurological: Negative.    Hematological: Negative.    Psychiatric/Behavioral: Negative.         Objective   BP (!) 158/100   Pulse 90   Temp 37 °C (98.6 °F)   Ht 1.829 m (6')   Wt 108 kg (238 lb)   SpO2 94%   BMI 32.28 kg/m²     Physical Exam  Constitutional:       Appearance: Normal appearance. He is obese.   HENT:      Right Ear: Tympanic membrane normal.      Left Ear: Tympanic membrane and ear canal normal.      Nose: Nose normal.   Neck:      Vascular: No carotid bruit.   Cardiovascular:      Rate and Rhythm: Normal rate.   Pulmonary:      Effort: No respiratory distress.      Breath sounds: No stridor. No wheezing.   Abdominal:      Palpations: Abdomen is soft.      Tenderness: There is no guarding or rebound.   Skin:     Coloration: Skin is not jaundiced.    Neurological:      General: No focal deficit present.      Mental Status: He is alert and oriented to person, place, and time.   Psychiatric:         Mood and Affect: Mood normal.         Assessment/Plan   Patient will be referred to pulmonary clinic regarding left lung nodule.He will started on trulicity regarding uncontrolled diabetes.He will continue other medicatioons and will return to clinic in 3 months for follow up visit.s

## 2023-11-05 ASSESSMENT — ENCOUNTER SYMPTOMS
ENDOCRINE NEGATIVE: 1
HEMATOLOGIC/LYMPHATIC NEGATIVE: 1
NEUROLOGICAL NEGATIVE: 1
PSYCHIATRIC NEGATIVE: 1
EYES NEGATIVE: 1
ALLERGIC/IMMUNOLOGIC NEGATIVE: 1
RESPIRATORY NEGATIVE: 1
CARDIOVASCULAR NEGATIVE: 1
MUSCULOSKELETAL NEGATIVE: 1
CONSTITUTIONAL NEGATIVE: 1
GASTROINTESTINAL NEGATIVE: 1

## 2023-11-07 ENCOUNTER — LAB (OUTPATIENT)
Dept: LAB | Facility: LAB | Age: 58
End: 2023-11-07
Payer: COMMERCIAL

## 2023-11-07 DIAGNOSIS — E11.9 DIABETES MELLITUS TYPE 2 WITHOUT RETINOPATHY (MULTI): ICD-10-CM

## 2023-11-07 PROBLEM — K43.0 RECURRENT INCISIONAL HERNIA WITH INCARCERATION: Status: ACTIVE | Noted: 2023-11-07

## 2023-11-07 PROBLEM — L40.9 PSORIASIS: Status: ACTIVE | Noted: 2023-11-07

## 2023-11-07 PROBLEM — K57.90 DIVERTICULOSIS OF INTESTINE, PART UNSPECIFIED, WITHOUT PERFORATION OR ABSCESS WITHOUT BLEEDING: Status: ACTIVE | Noted: 2019-08-03

## 2023-11-07 PROBLEM — R06.00 DYSPNEA: Status: ACTIVE | Noted: 2023-11-07

## 2023-11-07 PROBLEM — E66.9 CLASS 1 OBESITY WITH BODY MASS INDEX (BMI) OF 32.0 TO 32.9 IN ADULT: Status: ACTIVE | Noted: 2023-11-07

## 2023-11-07 PROBLEM — M50.20 HERNIATED DISC, CERVICAL: Status: ACTIVE | Noted: 2023-11-07

## 2023-11-07 PROBLEM — K40.91 RECURRENT RIGHT INGUINAL HERNIA: Status: ACTIVE | Noted: 2023-11-07

## 2023-11-07 PROBLEM — H52.7 REFRACTIVE ERROR: Status: ACTIVE | Noted: 2023-11-07

## 2023-11-07 PROBLEM — F14.10 COCAINE ABUSE (MULTI): Status: ACTIVE | Noted: 2023-11-07

## 2023-11-07 PROBLEM — G47.00 INSOMNIA: Status: ACTIVE | Noted: 2023-11-07

## 2023-11-07 PROBLEM — G89.29 CHRONIC LOW BACK PAIN: Status: ACTIVE | Noted: 2023-11-07

## 2023-11-07 PROBLEM — M62.82 RHABDOMYOLYSIS: Status: ACTIVE | Noted: 2019-08-03

## 2023-11-07 PROBLEM — Z22.322 MRSA CARRIER: Status: ACTIVE | Noted: 2023-11-07

## 2023-11-07 PROBLEM — M54.50 CHRONIC LOW BACK PAIN: Status: ACTIVE | Noted: 2023-11-07

## 2023-11-07 PROBLEM — H52.13 BILATERAL MYOPIA: Status: ACTIVE | Noted: 2023-11-07

## 2023-11-07 PROBLEM — Z90.49 ACQUIRED ABSENCE OF OTHER SPECIFIED PARTS OF DIGESTIVE TRACT: Status: ACTIVE | Noted: 2019-08-03

## 2023-11-07 PROBLEM — F33.9 MAJOR DEPRESSIVE DISORDER, RECURRENT, UNSPECIFIED (CMS-HCC): Status: ACTIVE | Noted: 2019-08-03

## 2023-11-07 PROBLEM — R26.2 DIFFICULTY IN WALKING, NOT ELSEWHERE CLASSIFIED: Status: ACTIVE | Noted: 2020-06-03

## 2023-11-07 PROBLEM — H52.4 BILATERAL PRESBYOPIA: Status: ACTIVE | Noted: 2023-11-07

## 2023-11-07 PROBLEM — R53.1 WEAKNESS: Status: ACTIVE | Noted: 2020-06-03

## 2023-11-07 PROBLEM — R48.8 OTHER SYMBOLIC DYSFUNCTIONS: Status: ACTIVE | Noted: 2020-06-03

## 2023-11-07 PROBLEM — E78.2 HYPERLIPEMIA, MIXED: Status: ACTIVE | Noted: 2023-11-07

## 2023-11-07 PROBLEM — R10.30 INGUINAL PAIN: Status: ACTIVE | Noted: 2023-11-07

## 2023-11-07 PROBLEM — H02.839 DERMATOCHALASIS: Status: ACTIVE | Noted: 2023-11-07

## 2023-11-07 PROBLEM — H25.10 AGE-RELATED NUCLEAR CATARACT: Status: ACTIVE | Noted: 2023-11-07

## 2023-11-07 PROBLEM — K72.91: Status: ACTIVE | Noted: 2019-08-03

## 2023-11-07 PROBLEM — E11.49: Status: ACTIVE | Noted: 2023-05-09

## 2023-11-07 PROBLEM — E03.9 HYPOTHYROIDISM: Status: ACTIVE | Noted: 2023-11-07

## 2023-11-07 PROBLEM — M47.816 LUMBAR SPONDYLOSIS: Status: ACTIVE | Noted: 2023-11-07

## 2023-11-07 PROBLEM — M43.10 SPONDYLOLISTHESIS, ACQUIRED: Status: ACTIVE | Noted: 2023-11-07

## 2023-11-07 PROBLEM — S09.90XA INJURY OF HEAD: Status: ACTIVE | Noted: 2023-11-07

## 2023-11-07 PROBLEM — E66.811 CLASS 1 OBESITY WITH BODY MASS INDEX (BMI) OF 32.0 TO 32.9 IN ADULT: Status: ACTIVE | Noted: 2023-11-07

## 2023-11-07 PROBLEM — N20.0 KIDNEY STONE ON LEFT SIDE: Status: ACTIVE | Noted: 2023-11-07

## 2023-11-07 PROBLEM — Z93.2 ILEOSTOMY STATUS (MULTI): Status: ACTIVE | Noted: 2023-11-07

## 2023-11-07 PROBLEM — N20.0 CALCIUM NEPHROLITHIASIS: Status: ACTIVE | Noted: 2023-11-07

## 2023-11-07 PROBLEM — I10 ESSENTIAL HYPERTENSION: Status: ACTIVE | Noted: 2019-08-03

## 2023-11-07 PROBLEM — I10 BENIGN ESSENTIAL HYPERTENSION: Status: ACTIVE | Noted: 2023-11-07

## 2023-11-07 LAB
ALBUMIN SERPL BCP-MCNC: 4.6 G/DL (ref 3.4–5)
ALP SERPL-CCNC: 58 U/L (ref 33–120)
ALT SERPL W P-5'-P-CCNC: 35 U/L (ref 10–52)
ANION GAP SERPL CALC-SCNC: 16 MMOL/L (ref 10–20)
AST SERPL W P-5'-P-CCNC: 23 U/L (ref 9–39)
BILIRUB SERPL-MCNC: 0.4 MG/DL (ref 0–1.2)
BUN SERPL-MCNC: 23 MG/DL (ref 6–23)
CALCIUM SERPL-MCNC: 9.6 MG/DL (ref 8.6–10.6)
CHLORIDE SERPL-SCNC: 102 MMOL/L (ref 98–107)
CHOLEST SERPL-MCNC: 287 MG/DL (ref 0–199)
CHOLESTEROL/HDL RATIO: 8
CO2 SERPL-SCNC: 23 MMOL/L (ref 21–32)
CREAT SERPL-MCNC: 1 MG/DL (ref 0.5–1.3)
CREAT UR-MCNC: 74.2 MG/DL (ref 20–370)
EST. AVERAGE GLUCOSE BLD GHB EST-MCNC: 194 MG/DL
GFR SERPL CREATININE-BSD FRML MDRD: 87 ML/MIN/1.73M*2
GLUCOSE SERPL-MCNC: 214 MG/DL (ref 74–99)
HBA1C MFR BLD: 8.4 %
HDLC SERPL-MCNC: 36 MG/DL
LDLC SERPL CALC-MCNC: ABNORMAL MG/DL
MICROALBUMIN UR-MCNC: 715.3 MG/L
MICROALBUMIN/CREAT UR: 964 UG/MG CREAT
NON HDL CHOLESTEROL: 251 MG/DL (ref 0–149)
POTASSIUM SERPL-SCNC: 4.2 MMOL/L (ref 3.5–5.3)
PROT SERPL-MCNC: 7.1 G/DL (ref 6.4–8.2)
SODIUM SERPL-SCNC: 137 MMOL/L (ref 136–145)
TRIGL SERPL-MCNC: 1464 MG/DL (ref 0–149)
VLDL: ABNORMAL

## 2023-11-07 PROCEDURE — 82043 UR ALBUMIN QUANTITATIVE: CPT

## 2023-11-07 PROCEDURE — 80053 COMPREHEN METABOLIC PANEL: CPT

## 2023-11-07 PROCEDURE — 83036 HEMOGLOBIN GLYCOSYLATED A1C: CPT

## 2023-11-07 PROCEDURE — 82570 ASSAY OF URINE CREATININE: CPT

## 2023-11-07 PROCEDURE — 80061 LIPID PANEL: CPT

## 2023-11-07 PROCEDURE — 36415 COLL VENOUS BLD VENIPUNCTURE: CPT

## 2023-11-07 RX ORDER — CALCIUM CITRATE/VITAMIN D3 200MG-6.25
TABLET ORAL
COMMUNITY
Start: 2023-11-03

## 2023-11-08 ENCOUNTER — OFFICE VISIT (OUTPATIENT)
Dept: CARDIOLOGY | Facility: HOSPITAL | Age: 58
End: 2023-11-08
Payer: COMMERCIAL

## 2023-11-08 ENCOUNTER — PHARMACY VISIT (OUTPATIENT)
Dept: PHARMACY | Facility: CLINIC | Age: 58
End: 2023-11-08
Payer: COMMERCIAL

## 2023-11-08 VITALS
WEIGHT: 236.8 LBS | BODY MASS INDEX: 32.12 KG/M2 | HEART RATE: 89 BPM | OXYGEN SATURATION: 94 % | SYSTOLIC BLOOD PRESSURE: 147 MMHG | DIASTOLIC BLOOD PRESSURE: 103 MMHG

## 2023-11-08 DIAGNOSIS — E11.9 DIABETES MELLITUS TYPE 2 WITHOUT RETINOPATHY (MULTI): ICD-10-CM

## 2023-11-08 DIAGNOSIS — R07.89 OTHER CHEST PAIN: ICD-10-CM

## 2023-11-08 DIAGNOSIS — E78.1 HYPERTRIGLYCERIDEMIA: Primary | ICD-10-CM

## 2023-11-08 PROCEDURE — 99215 OFFICE O/P EST HI 40 MIN: CPT | Performed by: INTERNAL MEDICINE

## 2023-11-08 PROCEDURE — RXMED WILLOW AMBULATORY MEDICATION CHARGE

## 2023-11-08 PROCEDURE — 3077F SYST BP >= 140 MM HG: CPT | Performed by: INTERNAL MEDICINE

## 2023-11-08 PROCEDURE — 3080F DIAST BP >= 90 MM HG: CPT | Performed by: INTERNAL MEDICINE

## 2023-11-08 PROCEDURE — 4010F ACE/ARB THERAPY RXD/TAKEN: CPT | Performed by: INTERNAL MEDICINE

## 2023-11-08 PROCEDURE — 1036F TOBACCO NON-USER: CPT | Performed by: INTERNAL MEDICINE

## 2023-11-08 PROCEDURE — 3062F POS MACROALBUMINURIA REV: CPT | Performed by: INTERNAL MEDICINE

## 2023-11-08 PROCEDURE — 97802 MEDICAL NUTRITION INDIV IN: CPT | Mod: 59 | Performed by: INTERNAL MEDICINE

## 2023-11-08 PROCEDURE — 3052F HG A1C>EQUAL 8.0%<EQUAL 9.0%: CPT | Performed by: INTERNAL MEDICINE

## 2023-11-08 RX ORDER — BLOOD-GLUCOSE TRANSMITTER
EACH MISCELLANEOUS
Qty: 3 EACH | Refills: 3 | Status: SHIPPED | OUTPATIENT
Start: 2023-11-08

## 2023-11-08 RX ORDER — DAPAGLIFLOZIN AND METFORMIN HYDROCHLORIDE 10; 1000 MG/1; MG/1
1 TABLET, FILM COATED, EXTENDED RELEASE ORAL
Qty: 30 TABLET | Refills: 11 | Status: SHIPPED | OUTPATIENT
Start: 2023-11-08

## 2023-11-08 RX ORDER — BLOOD-GLUCOSE SENSOR
1 EACH MISCELLANEOUS SEE ADMIN INSTRUCTIONS
Qty: 9 EACH | Refills: 3 | Status: SHIPPED | OUTPATIENT
Start: 2023-11-08 | End: 2024-11-07

## 2023-11-08 RX ORDER — BLOOD-GLUCOSE,RECEIVER,CONT
EACH MISCELLANEOUS
Qty: 1 EACH | Refills: 0 | Status: SHIPPED | OUTPATIENT
Start: 2023-11-08

## 2023-11-08 RX ORDER — ICOSAPENT ETHYL 1 G/1
2 CAPSULE ORAL
Qty: 120 CAPSULE | Refills: 11 | Status: CANCELLED | OUTPATIENT
Start: 2023-11-08 | End: 2024-11-07
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
## 2023-11-09 ENCOUNTER — PHARMACY VISIT (OUTPATIENT)
Dept: PHARMACY | Facility: CLINIC | Age: 58
End: 2023-11-09
Payer: MEDICAID

## 2023-11-09 PROCEDURE — RXMED WILLOW AMBULATORY MEDICATION CHARGE
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
## 2023-11-12 DIAGNOSIS — I10 ESSENTIAL (PRIMARY) HYPERTENSION: ICD-10-CM

## 2023-11-12 RX ORDER — LISINOPRIL 40 MG/1
40 TABLET ORAL DAILY
Qty: 30 TABLET | Refills: 0 | Status: SHIPPED | OUTPATIENT
Start: 2023-11-12 | End: 2023-12-23

## 2023-11-14 DIAGNOSIS — R06.02 SHORT OF BREATH ON EXERTION: ICD-10-CM

## 2023-11-14 DIAGNOSIS — I10 ESSENTIAL (PRIMARY) HYPERTENSION: ICD-10-CM

## 2023-11-14 RX ORDER — METOPROLOL SUCCINATE 100 MG/1
TABLET, EXTENDED RELEASE ORAL
Qty: 180 TABLET | Refills: 0 | Status: SHIPPED | OUTPATIENT
Start: 2023-11-14

## 2023-11-17 ENCOUNTER — TELEPHONE (OUTPATIENT)
Dept: CARDIOLOGY | Facility: HOSPITAL | Age: 58
End: 2023-11-17
Payer: COMMERCIAL

## 2023-11-17 NOTE — TELEPHONE ENCOUNTER
[facility] follow up note    Spoke with patient on phone for 2 week follow up. He reports to be doing well. He stopped drinking sugar sweetened beverages, he stopped adding kuhlua to his morning coffee. He did receive the Dexcom g6 in mail but needs assistance with setup. I will meet with him on Dec 1 st at noon at  [place] location.     Provided pt with contact # for pulmonology per his request.     Pt still does not have internet at home, does not understand how to use his phone other than making calls.     Allyn Rosa RD, [facility]

## 2023-11-22 ENCOUNTER — TELEMEDICINE (OUTPATIENT)
Dept: PHARMACY | Facility: HOSPITAL | Age: 58
End: 2023-11-22
Payer: COMMERCIAL

## 2023-11-22 DIAGNOSIS — E11.9 DIABETES MELLITUS TYPE 2 WITHOUT RETINOPATHY (MULTI): Primary | ICD-10-CM

## 2023-11-22 RX ORDER — DULAGLUTIDE 0.75 MG/.5ML
0.75 INJECTION, SOLUTION SUBCUTANEOUS
Qty: 2 ML | Refills: 1 | Status: SHIPPED | OUTPATIENT
Start: 2023-11-22 | End: 2023-12-04 | Stop reason: SDUPTHER

## 2023-11-22 NOTE — PROGRESS NOTES
Pharmacy [facility]   Follow Up  Patient is sent at the request of Isaias Lorenzo MD for medication management.  My final recommendations will be communicated back to the requesting provider by way of shared medical record.    Subjective   Diabetes  He presents for his initial diabetic visit. He has type 2 diabetes mellitus. His disease course has been improving (A1c 8.4% from 9%).     Cardiovascular risk factors include diabetes mellitus, hypertension, male gender, and obesity (BMI >= 30 kg/m2). The patient is on an ACE inhibitor or angiotensin II receptor blocker.      Allergies   Allergen Reactions    Prednisone Anxiety     Current monitoring regimen  Patient is using continuous glucose monitor; Dexcom G6    Continuous Glucose Monitor (CGM) Report: Does not have set up yet; will go into the office next week to get set up     Reported SMBG Measurements  FBG: n/a  PPBG: n/a    Adverse Effects:   Patient admits to GI adverse effects including: nausea but only in the mornings     Reported Weight:  Baseline: 236 lbs   Current: 236 lbs   Patient reported goal: 200 lbs    Affordability/Accessibility  No issues     Objective     Pharmacotherapy:    Xigduo XR  mg 1 tablet by mouth once a day  Glimepiride 4 mg 1 tablet by mouth in the morning and one half in the evening with meals   Trulicity 0.75 mg inject 0.75 mg under the skin once a week (Started 11/6)  Dexcom G6    Historical Pharmacotherapy:  Farxiga 10 mg     Secondary Prevention  Statin? Yes - Rosuvastatin 20 mg   ACE-I/ARB? Yes - Lisinopril 40 mg     Pertinent PMH Review  PMH of Pancreatitis: No  PMH of Retinopathy: No  PMH of Recurrent Urinary Tract Infections: No  PMH of Medullary Thyroid Cancer (MTC): No  PMH of Multiple Endocrine Neoplasia (MEN) Type II: No    Health Maintenance:   Foot Exam: does not follow with podiatrist  Eye Exam: does not follow with optometrist  Lipid Panel: TG too elevated to calculate LDL   Urine Albumin: 715  mg/L  Influenza Vaccine: unknown   Pneumonia Vaccine: PCV13 unknown, PPSV23 unknown    Labs:   Lab Results   Component Value Date    BILITOT 0.4 11/07/2023    CALCIUM 9.6 11/07/2023    CO2 23 11/07/2023     11/07/2023    CREATININE 1.00 11/07/2023    GLUCOSE 214 (H) 11/07/2023    ALKPHOS 58 11/07/2023    K 4.2 11/07/2023    PROT 7.1 11/07/2023     11/07/2023    AST 23 11/07/2023    ALT 35 11/07/2023    BUN 23 11/07/2023    ANIONGAP 16 11/07/2023    MG 1.75 11/23/2021    PHOS 2.4 (L) 02/04/2020     08/20/2019    ALBUMIN 4.6 11/07/2023    AMYLASE 70 12/31/2020    LIPASE 18 04/05/2021    GFRMALE 70 05/09/2023     Lab Results   Component Value Date    TRIG 1,464 (H) 11/07/2023    CHOL 287 (H) 11/07/2023    LDLCALC  11/07/2023      Comment:      The calculation of LDL and VLDL are inaccurate when the Triglycerides are greater than 400 mg/dL or when the patient is non-fasting. If LDL measurement is necessary contact the testing laboratory for an alternative LDL assay.                                  Near   Borderline      AGE      Desirable  Optimal    High     High     Very High     0-19 Y     0 - 109     ---    110-129   >/= 130     ----    20-24 Y     0 - 119     ---    120-159   >/= 160     ----      >24 Y     0 -  99   100-129  130-159   160-189     >/=190      HDL 36.0 11/07/2023     Lab Results   Component Value Date    HGBA1C 8.4 (H) 11/07/2023    HGBA1C 9.0 (A) 11/03/2023    HGBA1C 10.0 (A) 05/09/2023     The 10-year ASCVD risk score (Jonathan DENTON, et al., 2019) is: 35.8%    Values used to calculate the score:      Age: 57 years      Sex: Male      Is Non- : No      Diabetic: Yes      Tobacco smoker: No      Systolic Blood Pressure: 147 mmHg      Is BP treated: Yes      HDL Cholesterol: 36 mg/dL      Total Cholesterol: 287 mg/dL    Trulicity Education:  - Counseled patient on Trulicity MOA, expectations, administration, and monitoring parameters.   - Counseled patient that  Trulicity may cause some GI adverse effects (upset stomach/diarrhea/constipation/nausea/vomiting) when starting out and ways to mitigate include eating smaller meals and limiting greasy or deep fried foods as this increases risk of nausea.   - Advised patient that they may experience improved satiety after meals and portion sizes of meals may be reduced as doses of Trulicity increase.   - Benefits of Trulicity in addition to glycemic control include cardioprotection, renal protection and weight loss.   - Reviewed Trulicity titration schedule, starting with 0.75 mg once weekly to a goal of 3 mg once weekly if tolerated    Xigduo Education:  - Educated patient on Xigduo MOA, expectations, administration and monitoring parameters.  - Counseled patient that Xigduo puts them at risk for UTI/genital candidiasis; maintaining and practicing proper hygiene will minimize risk of this side effect.   - Encouraged patient to increase fluid intake as Xigduo may cause dehydration due to potential frequent urination. Reviewed benefits in addition to glycemic control including cardioprotection, renal protection and weight loss.      Assessment/Plan   Problem List Items Addressed This Visit          Endocrine/Metabolic    Diabetes mellitus type 2 without retinopathy (CMS/[facility]) - Primary     Patients diabetes is poorly controlled with most recent A1c of 8.4% (goal < 7 %). Will assess blood glucose readings once patient set up with Dexcom.   Continue Trulicity 0.75 mg, Glimepiride 4 mg, Xigduo  mg  Patient requesting refills on Trulicity; [facility] to [facility] to be delivered to patient   Future considerations: to reduce medication burden and if blood glucose tightly controlled, will discontinue glimepiride.   Compliance at present is estimated to be good.   Recommend an updated eye and foot exam. Per ADA Guidelines, recommend that all patients with diabetes have yearly screening of feet and eyes to reduce the risk of blindness and  avoidable limb amputations.  Continue to work on lifestyle interventions such as exercising more, at least 30 minutes per day, 5 days of the week. Continue to implement healthy lifestyle changes such as eating less processed food. Focus on eating more fruits and vegetables, incorporate whole foods into daily diet.    Follow up: 1 month by telephone.    Khari Bull, PharmD    Continue all meds under the continuation of care with the referring provider and clinical pharmacy team.

## 2023-11-24 DIAGNOSIS — E03.9 HYPOTHYROIDISM, UNSPECIFIED: ICD-10-CM

## 2023-11-24 DIAGNOSIS — G89.29 OTHER CHRONIC PAIN: ICD-10-CM

## 2023-11-24 DIAGNOSIS — M54.9 DORSALGIA, UNSPECIFIED: ICD-10-CM

## 2023-11-24 RX ORDER — LEVOTHYROXINE SODIUM 150 UG/1
150 TABLET ORAL DAILY
Qty: 90 TABLET | Refills: 0 | Status: SHIPPED | OUTPATIENT
Start: 2023-11-24 | End: 2024-11-23

## 2023-11-24 RX ORDER — GABAPENTIN 100 MG/1
CAPSULE ORAL
Qty: 540 CAPSULE | Refills: 0 | Status: SHIPPED | OUTPATIENT
Start: 2023-11-24

## 2023-11-30 DIAGNOSIS — I10 BENIGN ESSENTIAL HYPERTENSION: Primary | ICD-10-CM

## 2023-12-01 ENCOUNTER — DOCUMENTATION (OUTPATIENT)
Dept: CASE MANAGEMENT | Facility: HOSPITAL | Age: 58
End: 2023-12-01

## 2023-12-01 NOTE — PROGRESS NOTES
I communicated with the patient to inform him of his appointments requested. The pulmonary appointment is schedule for January 5,2025 at 3:40 in [facility] 6th floor.    The client was connected to [facility]. His appointment made with for Dec. 5th at 1:30 pm. This appointment is pending because of the cost of parking. The client was informed of his appointments and [facility] referral processed.

## 2023-12-04 ENCOUNTER — TELEMEDICINE (OUTPATIENT)
Dept: PRIMARY CARE | Facility: CLINIC | Age: 58
End: 2023-12-04
Payer: COMMERCIAL

## 2023-12-04 DIAGNOSIS — E03.9 HYPOTHYROIDISM (ACQUIRED): ICD-10-CM

## 2023-12-04 DIAGNOSIS — I25.118 CORONARY ARTERY DISEASE OF NATIVE ARTERY OF NATIVE HEART WITH STABLE ANGINA PECTORIS (CMS-HCC): Primary | ICD-10-CM

## 2023-12-04 DIAGNOSIS — E78.5 DYSLIPIDEMIA: ICD-10-CM

## 2023-12-04 DIAGNOSIS — E11.9 DIABETES MELLITUS TYPE 2 WITHOUT RETINOPATHY (MULTI): ICD-10-CM

## 2023-12-04 PROCEDURE — 99442 PR PHYS/QHP TELEPHONE EVALUATION 11-20 MIN: CPT | Performed by: INTERNAL MEDICINE

## 2023-12-04 RX ORDER — DULAGLUTIDE 0.75 MG/.5ML
0.75 INJECTION, SOLUTION SUBCUTANEOUS
Qty: 2 ML | Refills: 1 | Status: SHIPPED | OUTPATIENT
Start: 2023-12-04

## 2023-12-04 RX ORDER — FENOFIBRATE 200 MG/1
200 CAPSULE ORAL
Qty: 30 CAPSULE | Refills: 5 | Status: SHIPPED | OUTPATIENT
Start: 2023-12-04 | End: 2024-06-01

## 2023-12-04 RX ORDER — EZETIMIBE 10 MG/1
10 TABLET ORAL DAILY
Qty: 90 TABLET | Refills: 1 | Status: SHIPPED | OUTPATIENT
Start: 2023-12-04 | End: 2024-12-03

## 2023-12-04 NOTE — PROGRESS NOTES
Subjective   Patient ID: Maurice Sterling is a 58 y.o. male who presents for Follow-up.    HPI patient is attended by phone visit to discuss recent blood test report.  He is otherwise doing well and denies any cough, congestion, abdominal pain, chest pain and shortness of breath.  Laboratory studies done shows serum cholesterol of 287, cholesterol to HDL ratio of 8.0, triglycerides of 1464 and other studies have been reviewed and discussed with him on his previous visit.  He has history of psoriasis,htn,obesity,psoriasis,sciatica,bipolar disorder, alcohol dependence, obesity, coronary artery disease with stent insertion, type 2 diabetes, dyslipidemia, chronic back pain, hypothyroidism, severe hepatic steatosis, thrombocytopenia and anxiety disorder.     Review of Systems   Constitutional: Negative.    HENT: Negative.     Eyes: Negative.    Respiratory: Negative.     Cardiovascular: Negative.    Gastrointestinal: Negative.    Endocrine: Negative.    Genitourinary: Negative.    Musculoskeletal: Negative.    Skin: Negative.    Allergic/Immunologic: Negative.    Neurological: Negative.    Hematological: Negative.    Psychiatric/Behavioral: Negative.         Objective   There were no vitals taken for this visit.    Physical Examnot done    Assessment/Plan    patient will be  started on Zetia and fenofibrate regarding elevated cholesterol and triglycerides.  He will continue other medications and will return to clinic in 3 months for follow-up visit.

## 2023-12-04 NOTE — PROGRESS NOTES
Subjective   Patient ID: Maurice Sterling is a 58 y.o. male who presents for Follow-up.    HPI     Review of Systems    Objective   There were no vitals taken for this visit.    Physical Exam    Assessment/Plan

## 2023-12-07 ASSESSMENT — ENCOUNTER SYMPTOMS
EYES NEGATIVE: 1
PSYCHIATRIC NEGATIVE: 1
MUSCULOSKELETAL NEGATIVE: 1
NEUROLOGICAL NEGATIVE: 1
ENDOCRINE NEGATIVE: 1
GASTROINTESTINAL NEGATIVE: 1
ALLERGIC/IMMUNOLOGIC NEGATIVE: 1
RESPIRATORY NEGATIVE: 1
CARDIOVASCULAR NEGATIVE: 1
HEMATOLOGIC/LYMPHATIC NEGATIVE: 1
CONSTITUTIONAL NEGATIVE: 1

## 2023-12-08 ENCOUNTER — CLINICAL SUPPORT (OUTPATIENT)
Dept: NUTRITION | Facility: HOSPITAL | Age: 58
End: 2023-12-08
Payer: COMMERCIAL

## 2023-12-08 DIAGNOSIS — I10 BENIGN ESSENTIAL HYPERTENSION: ICD-10-CM

## 2023-12-08 NOTE — PROGRESS NOTES
[employer]  Diet Recommendation 1: Heart Healthy  Diet Recommendation 2: Diabetes  Diet Recommendation 3: Healthy Eating  Household Size: 2 Family Members  Interventions: Referral Number: 1st 6 Mo Referral 6 Mos  Interventions: Visit Number: 1 of 6 Visits - Max 6 Visits/Referral Each 6 Mo Period  Education Today: Carbohydrate Counting (fiber)  Grains: Whole - 100%  Fruit: Fresh - 100%  Vegetables: Fresh - 100%  Proteins: 1-2 Plant-based Items  Dairy: 50-75% Lowfat  Originating Site of Referral Order: [facility]  Initials of RD Assisting Today:

## 2023-12-12 ENCOUNTER — TELEPHONE (OUTPATIENT)
Dept: PRIMARY CARE | Facility: CLINIC | Age: 58
End: 2023-12-12

## 2023-12-12 NOTE — TELEPHONE ENCOUNTER
Voicemail left for patient to make a surgical clearance     Form is in the pending folder on my desk

## 2023-12-20 ENCOUNTER — TELEMEDICINE (OUTPATIENT)
Dept: PHARMACY | Facility: HOSPITAL | Age: 58
End: 2023-12-20

## 2023-12-20 DIAGNOSIS — E11.9 DIABETES MELLITUS TYPE 2 WITHOUT RETINOPATHY (MULTI): Primary | ICD-10-CM

## 2023-12-20 ASSESSMENT — ENCOUNTER SYMPTOMS: DIABETIC ASSOCIATED SYMPTOMS: 0

## 2023-12-20 NOTE — PROGRESS NOTES
Pharmacy [facility]   Follow Up  Patient is sent at the request of Isaias Lorenzo MD for medication management.  My final recommendations will be communicated back to the requesting provider by way of shared medical record.    Subjective   Diabetes  He presents for his follow-up diabetic visit. He has type 2 diabetes mellitus. His disease course has been improving (A1c 8.4% from 9%). There are no hypoglycemic associated symptoms. There are no diabetic associated symptoms. Symptoms are stable. Risk factors for coronary artery disease include diabetes mellitus, hypertension, male sex and obesity.   Patient recently lost his prescription insurance coverage so he has not taken Trulicity since 11/27/23. Notes he only has about 5 tablets left of Xigduo. Patient has plans of applying for new insurance soon as he is on a lot of important medications.    Cardiovascular risk factors include diabetes mellitus, hypertension, male gender, and obesity (BMI >= 30 kg/m2). The patient is on an ACE inhibitor or angiotensin II receptor blocker.      Allergies   Allergen Reactions    Prednisone Anxiety     Current monitoring regimen  Patient is using continuous glucose monitor; Dexcom G6    Continuous Glucose Monitor (CGM) Report: Not set up to access data     Reported SMBG Measurements  - Patient notes his BG has been running in 260s     Adverse Effects:   Patient denies any GI adverse effects (Upset stomach/diarrhea/constipation/nausea/vomiting)    Reported Weight:  Baseline: 236 lbs   Current: 236 lbs   Patient reported goal: 200 lbs    Affordability/Accessibility  No issues     Objective     Pharmacotherapy:    Xigduo XR  mg 1 tablet by mouth once a day  Glimepiride 4 mg 1 tablet by mouth in the morning and one half in the evening with meals   Trulicity 0.75 mg inject 0.75 mg under the skin once a week -- Last dose taken on 11/27  Dexcom G6    Historical Pharmacotherapy:  Farxiga 10 mg     Health Maintenance:   Foot  Exam: does not follow with podiatrist  Eye Exam: does not follow with optometrist  Lipid Panel: TG too elevated to calculate LDL   Urine Albumin: 715 mg/L  Influenza Vaccine: unknown   Pneumonia Vaccine: PCV13 unknown, PPSV23 unknown    Labs:   Lab Results   Component Value Date    BILITOT 0.4 11/07/2023    CALCIUM 9.6 11/07/2023    CO2 23 11/07/2023     11/07/2023    CREATININE 1.00 11/07/2023    GLUCOSE 214 (H) 11/07/2023    ALKPHOS 58 11/07/2023    K 4.2 11/07/2023    PROT 7.1 11/07/2023     11/07/2023    AST 23 11/07/2023    ALT 35 11/07/2023    BUN 23 11/07/2023    ANIONGAP 16 11/07/2023    MG 1.75 11/23/2021    PHOS 2.4 (L) 02/04/2020     08/20/2019    ALBUMIN 4.6 11/07/2023    AMYLASE 70 12/31/2020    LIPASE 18 04/05/2021    GFRMALE 70 05/09/2023     Lab Results   Component Value Date    TRIG 1,464 (H) 11/07/2023    CHOL 287 (H) 11/07/2023    LDLCALC  11/07/2023      Comment:      The calculation of LDL and VLDL are inaccurate when the Triglycerides are greater than 400 mg/dL or when the patient is non-fasting. If LDL measurement is necessary contact the testing laboratory for an alternative LDL assay.                                  Near   Borderline      AGE      Desirable  Optimal    High     High     Very High     0-19 Y     0 - 109     ---    110-129   >/= 130     ----    20-24 Y     0 - 119     ---    120-159   >/= 160     ----      >24 Y     0 -  99   100-129  130-159   160-189     >/=190      HDL 36.0 11/07/2023     Lab Results   Component Value Date    HGBA1C 8.4 (H) 11/07/2023    HGBA1C 9.0 (A) 11/03/2023    HGBA1C 10.0 (A) 05/09/2023     The 10-year ASCVD risk score (Jonathan DENTON, et al., 2019) is: 37.6%    Values used to calculate the score:      Age: 58 years      Sex: Male      Is Non- : No      Diabetic: Yes      Tobacco smoker: No      Systolic Blood Pressure: 147 mmHg      Is BP treated: Yes      HDL Cholesterol: 36 mg/dL      Total Cholesterol: 287  mg/dL      Assessment/Plan   Problem List Items Addressed This Visit          Endocrine/Metabolic    Diabetes mellitus type 2 without retinopathy (CMS/[facility]) - Primary     Patients diabetes is poorly controlled with most recent A1c of 8.4% (goal < 7 %). Patient's SMBG are not at goal. Patient has not taken Trulicity since 11/27 due to losing his rx insurance. Patient does not qualify for  patient assistance since patient does not have prescription insurance to cover a portion of the medication cost.     Patient will be due for their next A1c anytime after 2/7/2024.     Compliance at present is estimated to be good.     Recommend an updated eye and foot exam. Per ADA Guidelines, recommend that all patients with diabetes have yearly screening of feet and eyes to reduce the risk of blindness and avoidable limb amputations.    PLAN  Increase Glimepiride 4 mg to 1 tablet by mouth 2 times a day  Will be out of Xigduo in the next week; advised to keep an eye on blood sugars and increase glimepiride. If sugars elevated in the 400 or higher range with having symptoms, encouraged to seek medical attention in the emergency department.   Patient notes he would need to pay out of pocket for his medications. Advised to prioritize the following medications in the meantime if paying out of pocket:  Clopidogrel, aspirin   Lisinopril   Metoprolol succinate   Glimepiride   Continue to work on lifestyle interventions such as exercising more, at least 30 minutes per day, 5 days of the week. Continue to implement healthy lifestyle changes such as eating less processed food. Focus on eating more fruits and vegetables, incorporate whole foods into daily diet.   Follow up: 1 month by telephone. Encouraged patient to reach out with any concerns or questions prior to next appointment.      Khari Bull, PharmD    Continue all meds under the continuation of care with the referring provider and clinical pharmacy team.

## 2023-12-23 DIAGNOSIS — I10 ESSENTIAL (PRIMARY) HYPERTENSION: ICD-10-CM

## 2023-12-23 RX ORDER — LISINOPRIL 40 MG/1
40 TABLET ORAL DAILY
Qty: 30 TABLET | Refills: 0 | Status: SHIPPED | OUTPATIENT
Start: 2023-12-23 | End: 2024-01-22

## 2024-01-03 ENCOUNTER — PATIENT OUTREACH (OUTPATIENT)
Dept: CASE MANAGEMENT | Facility: HOSPITAL | Age: 59
End: 2024-01-03

## 2024-01-05 ENCOUNTER — APPOINTMENT (OUTPATIENT)
Dept: PULMONOLOGY | Facility: HOSPITAL | Age: 59
End: 2024-01-05
Payer: COMMERCIAL

## 2024-01-17 ENCOUNTER — TELEMEDICINE (OUTPATIENT)
Dept: PHARMACY | Facility: HOSPITAL | Age: 59
End: 2024-01-17

## 2024-01-17 DIAGNOSIS — E11.9 DIABETES MELLITUS TYPE 2 WITHOUT RETINOPATHY (MULTI): Primary | ICD-10-CM

## 2024-01-17 RX ORDER — GLIMEPIRIDE 4 MG/1
TABLET ORAL
Qty: 60 TABLET | Refills: 11 | Status: SHIPPED | OUTPATIENT
Start: 2024-01-17

## 2024-01-17 ASSESSMENT — ENCOUNTER SYMPTOMS: DIABETIC ASSOCIATED SYMPTOMS: 0

## 2024-01-17 NOTE — PROGRESS NOTES
Pharmacy [facility]   Follow Up  Patient is sent at the request of Isaias Lorenzo MD for medication management.  My final recommendations will be communicated back to the requesting provider by way of shared medical record.    Subjective   Diabetes  He presents for his follow-up diabetic visit. He has type 2 diabetes mellitus. His disease course has been improving (A1c 8.4% from 9%). There are no hypoglycemic associated symptoms. There are no diabetic associated symptoms. Symptoms are stable. Risk factors for coronary artery disease include diabetes mellitus, hypertension, male sex and obesity.     Patient recently lost his prescription insurance coverage so he has not taken Trulicity since 11/27/23. Patient has plans of applying for new insurance soon as he is on a lot of important medications.     Cardiovascular risk factors include diabetes mellitus, hypertension, male gender, and obesity (BMI >= 30 kg/m2). The patient is on an ACE inhibitor or angiotensin II receptor blocker.      Allergies   Allergen Reactions    Prednisone Anxiety     Current monitoring regimen  Patient is currently NOT checking blood sugars at home    Reported SMBG Measurements  - Patient notes he has not been checking his sugars since losing insurance    Affordability/Accessibility  Patient lost prescription coverage about a month ago, so he has been paying out of pocket on his medications    Objective     Prescribed Pharmacotherapy (Not taking d/t cost):    Xigduo XR  mg 1 tablet by mouth once a day  Trulicity 0.75 mg inject 0.75 mg under the skin once a week -- Last dose taken on 11/27  Dexcom G6    Pharmacotherapy (Currently Taking):  Glimepiride 4 mg 1 tablet by mouth in the morning and one tablet in the evening with meals     Historical Pharmacotherapy:  Farxiga 10 mg     Health Maintenance:   Foot Exam: does not follow with podiatrist  Eye Exam: does not follow with optometrist  Lipid Panel: TG too elevated to calculate LDL    Urine Albumin: 715 mg/L  Influenza Vaccine: unknown   Pneumonia Vaccine: PCV13 unknown, PPSV23 unknown    Labs:   Lab Results   Component Value Date    BILITOT 0.4 11/07/2023    CALCIUM 9.6 11/07/2023    CO2 23 11/07/2023     11/07/2023    CREATININE 1.00 11/07/2023    GLUCOSE 214 (H) 11/07/2023    ALKPHOS 58 11/07/2023    K 4.2 11/07/2023    PROT 7.1 11/07/2023     11/07/2023    AST 23 11/07/2023    ALT 35 11/07/2023    BUN 23 11/07/2023    ANIONGAP 16 11/07/2023    MG 1.75 11/23/2021    PHOS 2.4 (L) 02/04/2020     08/20/2019    ALBUMIN 4.6 11/07/2023    AMYLASE 70 12/31/2020    LIPASE 18 04/05/2021    GFRMALE 70 05/09/2023     Lab Results   Component Value Date    TRIG 1,464 (H) 11/07/2023    CHOL 287 (H) 11/07/2023    LDLCALC  11/07/2023      Comment:      The calculation of LDL and VLDL are inaccurate when the Triglycerides are greater than 400 mg/dL or when the patient is non-fasting. If LDL measurement is necessary contact the testing laboratory for an alternative LDL assay.                                  Near   Borderline      AGE      Desirable  Optimal    High     High     Very High     0-19 Y     0 - 109     ---    110-129   >/= 130     ----    20-24 Y     0 - 119     ---    120-159   >/= 160     ----      >24 Y     0 -  99   100-129  130-159   160-189     >/=190      HDL 36.0 11/07/2023     Lab Results   Component Value Date    HGBA1C 8.4 (H) 11/07/2023    HGBA1C 9.0 (A) 11/03/2023    HGBA1C 10.0 (A) 05/09/2023     The 10-year ASCVD risk score (Jonathan DENTON, et al., 2019) is: 37.6%    Values used to calculate the score:      Age: 58 years      Sex: Male      Is Non- : No      Diabetic: Yes      Tobacco smoker: No      Systolic Blood Pressure: 147 mmHg      Is BP treated: Yes      HDL Cholesterol: 36 mg/dL      Total Cholesterol: 287 mg/dL    Assessment/Plan   Problem List Items Addressed This Visit          Endocrine/Metabolic    Diabetes mellitus type 2 without  retinopathy (CMS/[facility]) - Primary   Patients diabetes is poorly controlled with most recent A1c of 8.4% (goal < 7 %). Patient's SMBG are not at goal. Patient has not had prescription insurance for about a month. Patient does not qualify for  patient assistance since patient does not have prescription insurance to cover a portion of the medication cost.   Currently it is difficult to make an in depth assessment of his glycemic control due to a lack of blood sugar readings.     Patient will be due for their next A1c anytime after 2/7/2024.    PLAN  Continue Glimepiride 4 mg to 1 tablet by mouth 2 times a day; will reassess glucose control at [facility]  Continue to work on lifestyle interventions such as exercising more, at least 30 minutes per day, 5 days of the week. Continue to implement healthy lifestyle changes such as eating less processed food. Focus on eating more fruits and vegetables, incorporate whole foods into daily diet.   Follow up: 1 month by telephone. Encouraged patient to reach out with any concerns or questions prior to next appointment.    Khari Bull, PharmD    Continue all meds under the continuation of care with the referring provider and clinical pharmacy team.

## 2024-02-11 DIAGNOSIS — E11.9 DIABETES MELLITUS TYPE 2 WITHOUT RETINOPATHY (MULTI): Primary | ICD-10-CM

## 2024-02-14 ENCOUNTER — TELEMEDICINE (OUTPATIENT)
Dept: PHARMACY | Facility: HOSPITAL | Age: 59
End: 2024-02-14
Payer: COMMERCIAL

## 2024-04-02 ENCOUNTER — APPOINTMENT (OUTPATIENT)
Dept: CARDIOLOGY | Facility: CLINIC | Age: 59
End: 2024-04-02
Payer: COMMERCIAL

## 2024-04-16 ENCOUNTER — APPOINTMENT (OUTPATIENT)
Dept: CARDIOLOGY | Facility: CLINIC | Age: 59
End: 2024-04-16
Payer: COMMERCIAL

## 2024-06-13 DIAGNOSIS — E78.5 DYSLIPIDEMIA: ICD-10-CM

## 2024-06-13 RX ORDER — EZETIMIBE 10 MG/1
10 TABLET ORAL DAILY
Qty: 90 TABLET | Refills: 5 | Status: SHIPPED | OUTPATIENT
Start: 2024-06-13 | End: 2025-06-13

## 2024-06-13 RX ORDER — FENOFIBRATE 200 MG/1
200 CAPSULE ORAL
Qty: 30 CAPSULE | Refills: 5 | Status: SHIPPED | OUTPATIENT
Start: 2024-06-13 | End: 2024-12-10

## 2024-06-27 ENCOUNTER — APPOINTMENT (OUTPATIENT)
Dept: RADIOLOGY | Facility: HOSPITAL | Age: 59
End: 2024-06-27
Payer: COMMERCIAL

## 2024-06-27 ENCOUNTER — HOSPITAL ENCOUNTER (EMERGENCY)
Facility: HOSPITAL | Age: 59
Discharge: HOME | End: 2024-06-28
Attending: STUDENT IN AN ORGANIZED HEALTH CARE EDUCATION/TRAINING PROGRAM
Payer: COMMERCIAL

## 2024-06-27 ENCOUNTER — APPOINTMENT (OUTPATIENT)
Dept: CARDIOLOGY | Facility: HOSPITAL | Age: 59
End: 2024-06-27
Payer: COMMERCIAL

## 2024-06-27 DIAGNOSIS — I10 HYPERTENSION, UNSPECIFIED TYPE: Primary | ICD-10-CM

## 2024-06-27 LAB
ALBUMIN SERPL-MCNC: 4.2 G/DL (ref 3.5–5)
ALP BLD-CCNC: 58 U/L (ref 35–125)
ALT SERPL-CCNC: 48 U/L (ref 5–40)
ANION GAP SERPL CALC-SCNC: 11 MMOL/L
AST SERPL-CCNC: 36 U/L (ref 5–40)
BASOPHILS # BLD AUTO: 0.03 X10*3/UL (ref 0–0.1)
BASOPHILS NFR BLD AUTO: 0.5 %
BILIRUB SERPL-MCNC: 0.2 MG/DL (ref 0.1–1.2)
BUN SERPL-MCNC: 20 MG/DL (ref 8–25)
CALCIUM SERPL-MCNC: 9.3 MG/DL (ref 8.5–10.4)
CHLORIDE SERPL-SCNC: 99 MMOL/L (ref 97–107)
CO2 SERPL-SCNC: 26 MMOL/L (ref 24–31)
CREAT SERPL-MCNC: 1 MG/DL (ref 0.4–1.6)
EGFRCR SERPLBLD CKD-EPI 2021: 86 ML/MIN/1.73M*2
EOSINOPHIL # BLD AUTO: 0.19 X10*3/UL (ref 0–0.7)
EOSINOPHIL NFR BLD AUTO: 3 %
ERYTHROCYTE [DISTWIDTH] IN BLOOD BY AUTOMATED COUNT: 12.4 % (ref 11.5–14.5)
GLUCOSE SERPL-MCNC: 104 MG/DL (ref 65–99)
HCT VFR BLD AUTO: 45.3 % (ref 41–52)
HGB BLD-MCNC: 15.6 G/DL (ref 13.5–17.5)
IMM GRANULOCYTES # BLD AUTO: 0.04 X10*3/UL (ref 0–0.7)
IMM GRANULOCYTES NFR BLD AUTO: 0.6 % (ref 0–0.9)
LYMPHOCYTES # BLD AUTO: 1.71 X10*3/UL (ref 1.2–4.8)
LYMPHOCYTES NFR BLD AUTO: 26.6 %
MCH RBC QN AUTO: 29.7 PG (ref 26–34)
MCHC RBC AUTO-ENTMCNC: 34.4 G/DL (ref 32–36)
MCV RBC AUTO: 86 FL (ref 80–100)
MONOCYTES # BLD AUTO: 0.78 X10*3/UL (ref 0.1–1)
MONOCYTES NFR BLD AUTO: 12.1 %
NEUTROPHILS # BLD AUTO: 3.68 X10*3/UL (ref 1.2–7.7)
NEUTROPHILS NFR BLD AUTO: 57.2 %
NRBC BLD-RTO: 0 /100 WBCS (ref 0–0)
PLATELET # BLD AUTO: 128 X10*3/UL (ref 150–450)
POTASSIUM SERPL-SCNC: 3.7 MMOL/L (ref 3.4–5.1)
PROT SERPL-MCNC: 6.7 G/DL (ref 5.9–7.9)
RBC # BLD AUTO: 5.26 X10*6/UL (ref 4.5–5.9)
SODIUM SERPL-SCNC: 136 MMOL/L (ref 133–145)
WBC # BLD AUTO: 6.4 X10*3/UL (ref 4.4–11.3)

## 2024-06-27 PROCEDURE — 71045 X-RAY EXAM CHEST 1 VIEW: CPT | Performed by: RADIOLOGY

## 2024-06-27 PROCEDURE — 84484 ASSAY OF TROPONIN QUANT: CPT | Performed by: STUDENT IN AN ORGANIZED HEALTH CARE EDUCATION/TRAINING PROGRAM

## 2024-06-27 PROCEDURE — 80053 COMPREHEN METABOLIC PANEL: CPT | Performed by: STUDENT IN AN ORGANIZED HEALTH CARE EDUCATION/TRAINING PROGRAM

## 2024-06-27 PROCEDURE — 85025 COMPLETE CBC W/AUTO DIFF WBC: CPT | Performed by: STUDENT IN AN ORGANIZED HEALTH CARE EDUCATION/TRAINING PROGRAM

## 2024-06-27 PROCEDURE — 93005 ELECTROCARDIOGRAM TRACING: CPT

## 2024-06-27 PROCEDURE — 71045 X-RAY EXAM CHEST 1 VIEW: CPT

## 2024-06-27 PROCEDURE — 99283 EMERGENCY DEPT VISIT LOW MDM: CPT | Mod: 25

## 2024-06-27 PROCEDURE — 36415 COLL VENOUS BLD VENIPUNCTURE: CPT | Performed by: STUDENT IN AN ORGANIZED HEALTH CARE EDUCATION/TRAINING PROGRAM

## 2024-06-27 RX ORDER — DULAGLUTIDE 1.5 MG/.5ML
INJECTION, SOLUTION SUBCUTANEOUS
COMMUNITY
Start: 2024-06-21

## 2024-06-27 ASSESSMENT — COLUMBIA-SUICIDE SEVERITY RATING SCALE - C-SSRS
2. HAVE YOU ACTUALLY HAD ANY THOUGHTS OF KILLING YOURSELF?: NO
6. HAVE YOU EVER DONE ANYTHING, STARTED TO DO ANYTHING, OR PREPARED TO DO ANYTHING TO END YOUR LIFE?: NO
1. IN THE PAST MONTH, HAVE YOU WISHED YOU WERE DEAD OR WISHED YOU COULD GO TO SLEEP AND NOT WAKE UP?: NO

## 2024-06-27 ASSESSMENT — PAIN - FUNCTIONAL ASSESSMENT: PAIN_FUNCTIONAL_ASSESSMENT: 0-10

## 2024-06-27 ASSESSMENT — PAIN SCALES - GENERAL: PAINLEVEL_OUTOF10: 10 - WORST POSSIBLE PAIN

## 2024-06-28 VITALS
OXYGEN SATURATION: 92 % | BODY MASS INDEX: 31.15 KG/M2 | HEART RATE: 69 BPM | RESPIRATION RATE: 18 BRPM | HEIGHT: 72 IN | DIASTOLIC BLOOD PRESSURE: 115 MMHG | SYSTOLIC BLOOD PRESSURE: 178 MMHG | WEIGHT: 230 LBS | TEMPERATURE: 98 F

## 2024-06-28 LAB — TROPONIN T SERPL-MCNC: 8 NG/L

## 2024-06-28 PROCEDURE — 2500000001 HC RX 250 WO HCPCS SELF ADMINISTERED DRUGS (ALT 637 FOR MEDICARE OP): Performed by: STUDENT IN AN ORGANIZED HEALTH CARE EDUCATION/TRAINING PROGRAM

## 2024-06-28 RX ORDER — AMLODIPINE BESYLATE 5 MG/1
5 TABLET ORAL ONCE
Status: COMPLETED | OUTPATIENT
Start: 2024-06-28 | End: 2024-06-28

## 2024-06-28 RX ORDER — AMLODIPINE BESYLATE 5 MG/1
5 TABLET ORAL DAILY
Qty: 14 TABLET | Refills: 0 | Status: SHIPPED | OUTPATIENT
Start: 2024-06-28 | End: 2024-07-12
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
## 2024-06-28 NOTE — DISCHARGE INSTRUCTIONS
You should continue to check your blood pressure and keep a log of the blood pressure numbers daily.  You should follow-up with your primary care physician.    It is important to remember that your care does not end here and you must continue to monitor your condition closely. Please return to the emergency department for any worsening or concerning signs or symptoms as directed by our conversations and the discharge instructions. If you do not have a doctor please contact the referral number on your discharge instructions. Please contact any physician specialists provided in your discharge notes as it is very important to follow up with them regarding your condition. If you are unable to reach the physicians provided, please come back to the Emergency Department at any time.    Return to emergency room without delay for ANY new or worsening pains or for any other symptoms or concerns.  Return with worsening pains, nausea, vomiting, trouble breathing, palpitations, shortness of breath, inability to pass stool or urine, loss of control of stool or urine, any numbness or tingling (that is not normal for you), uncontrolled fevers, the passing of blood or other material in stool or urine, rashes, pains or for any other symptoms or concerns you may have.  You are always welcome to return to the ER at any time for any reason or for any other concerns you may have.

## 2024-07-01 LAB
ATRIAL RATE: 74 BPM
P AXIS: 30 DEGREES
P OFFSET: 196 MS
P ONSET: 141 MS
PR INTERVAL: 164 MS
Q ONSET: 223 MS
QRS COUNT: 12 BEATS
QRS DURATION: 100 MS
QT INTERVAL: 400 MS
QTC CALCULATION(BAZETT): 444 MS
QTC FREDERICIA: 429 MS
R AXIS: -11 DEGREES
T AXIS: 16 DEGREES
T OFFSET: 423 MS
VENTRICULAR RATE: 74 BPM

## 2024-08-12 ENCOUNTER — APPOINTMENT (OUTPATIENT)
Dept: OPHTHALMOLOGY | Facility: CLINIC | Age: 59
End: 2024-08-12
Payer: COMMERCIAL

## 2024-08-12 DIAGNOSIS — H52.4 BILATERAL PRESBYOPIA: ICD-10-CM

## 2024-08-12 DIAGNOSIS — H52.13 BILATERAL MYOPIA: ICD-10-CM

## 2024-08-12 DIAGNOSIS — H25.13 AGE-RELATED NUCLEAR CATARACT OF BOTH EYES: ICD-10-CM

## 2024-08-12 DIAGNOSIS — E11.9 DIABETES MELLITUS TYPE 2 WITHOUT RETINOPATHY (MULTI): Primary | ICD-10-CM

## 2024-08-12 PROBLEM — H02.839 DERMATOCHALASIS: Status: RESOLVED | Noted: 2023-11-07 | Resolved: 2024-08-12

## 2024-08-12 PROBLEM — H52.7 REFRACTIVE ERROR: Status: RESOLVED | Noted: 2023-11-07 | Resolved: 2024-08-12

## 2024-08-12 PROCEDURE — 92014 COMPRE OPH EXAM EST PT 1/>: CPT | Performed by: STUDENT IN AN ORGANIZED HEALTH CARE EDUCATION/TRAINING PROGRAM

## 2024-08-12 PROCEDURE — 92015 DETERMINE REFRACTIVE STATE: CPT | Performed by: STUDENT IN AN ORGANIZED HEALTH CARE EDUCATION/TRAINING PROGRAM

## 2024-08-12 PROCEDURE — FLVLF CONTACT LENS EVALUATION (SP): Performed by: STUDENT IN AN ORGANIZED HEALTH CARE EDUCATION/TRAINING PROGRAM

## 2024-08-12 ASSESSMENT — CONF VISUAL FIELD
OD_SUPERIOR_TEMPORAL_RESTRICTION: 0
OS_SUPERIOR_NASAL_RESTRICTION: 0
METHOD: COUNTING FINGERS
OD_INFERIOR_TEMPORAL_RESTRICTION: 0
OS_INFERIOR_TEMPORAL_RESTRICTION: 0
OD_NORMAL: 1
OS_SUPERIOR_TEMPORAL_RESTRICTION: 0
OD_INFERIOR_NASAL_RESTRICTION: 0
OS_NORMAL: 1
OD_SUPERIOR_NASAL_RESTRICTION: 0
OS_INFERIOR_NASAL_RESTRICTION: 0

## 2024-08-12 ASSESSMENT — VISUAL ACUITY
OD_CC: 20/20
CORRECTION_TYPE: GLASSES
OD_CC+: -1
METHOD: SNELLEN - LINEAR
OS_CC: 20/20

## 2024-08-12 ASSESSMENT — REFRACTION_MANIFEST
OS_AXIS: 040
OD_CYLINDER: +1.00
OS_SPHERE: -2.75
OD_ADD: +2.50
OD_CYLINDER: -0.50
OD_AXIS: 092
OD_SPHERE: -1.50
OS_SPHERE: -2.50
OS_CYLINDER: +0.50
OD_AXIS: 002
OS_ADD: +2.50
OS_CYLINDER: -0.50
METHOD_AUTOREFRACTION: 1
OD_SPHERE: -2.00
OS_AXIS: 130

## 2024-08-12 ASSESSMENT — REFRACTION_CURRENTRX
OS_DIAMETER: 14.2
OS_BASECURVE: 8.6
OD_SPHERE: -2.25
OD_BRAND: AIR OPTIX HYDRAGLYDE
OS_SPHERE: -2.75
OD_DIAMETER: 14.2
OS_BRAND: AIR OPTIX HYDRAGLYDE
OD_BASECURVE: 8.6

## 2024-08-12 ASSESSMENT — ENCOUNTER SYMPTOMS
GASTROINTESTINAL NEGATIVE: 0
NEUROLOGICAL NEGATIVE: 0
ENDOCRINE NEGATIVE: 0
CARDIOVASCULAR NEGATIVE: 0
HEMATOLOGIC/LYMPHATIC NEGATIVE: 0
EYES NEGATIVE: 0
CONSTITUTIONAL NEGATIVE: 0
PSYCHIATRIC NEGATIVE: 0
RESPIRATORY NEGATIVE: 0
MUSCULOSKELETAL NEGATIVE: 0
ALLERGIC/IMMUNOLOGIC NEGATIVE: 0

## 2024-08-12 ASSESSMENT — TONOMETRY
OD_IOP_MMHG: 15
IOP_METHOD: TONOPEN
OS_IOP_MMHG: 16

## 2024-08-12 ASSESSMENT — REFRACTION_WEARINGRX
OS_CYLINDER: -0.75
OS_ADD: +2.50
OD_SPHERE: -2.00
OD_AXIS: 093
OD_ADD: +2.50
OS_AXIS: 045
OS_SPHERE: -2.25
OD_CYLINDER: -0.50

## 2024-08-12 ASSESSMENT — CUP TO DISC RATIO
OS_RATIO: 0.2
OD_RATIO: 0.3

## 2024-08-12 ASSESSMENT — EXTERNAL EXAM - LEFT EYE: OS_EXAM: NORMAL

## 2024-08-12 ASSESSMENT — EXTERNAL EXAM - RIGHT EYE: OD_EXAM: NORMAL

## 2024-08-12 NOTE — PROGRESS NOTES
Assessment/Plan   Diagnoses and all orders for this visit:  Diabetes mellitus type 2 without retinopathy (Multi)  -no retinopathy observed on exam today od/os, pt ed to continue good BGlc, blood pressure and lipid control, rtc with any changes in vision, otherwise monitor 1 year  Bilateral myopia  Bilateral presbyopia  -New spec rx released today per patient request. Ocular health wnl for age OU. Monitor 1 year or sooner prn. Refraction billed today.  -Discussed proper wear, care, replacement of contact lenses. Gave handout. D/c cl wear and RTC if eyes become red, painful, irritated. Monitor 1 year.   CL eval billed today. $35 Finalized RX for Air Optix C Hydraglyde   Age-related nuclear cataract of both eyes  -mild non visually significant. Pt ed. Monitor    RTC 1 year for annual with DFE, MRX, CL exam

## 2024-09-23 ENCOUNTER — TELEPHONE (OUTPATIENT)
Dept: CARDIOLOGY | Facility: CLINIC | Age: 59
End: 2024-09-23
Payer: COMMERCIAL

## 2024-09-23 DIAGNOSIS — E11.9 DIABETES MELLITUS TYPE 2 WITHOUT RETINOPATHY (MULTI): ICD-10-CM

## 2024-09-23 DIAGNOSIS — E78.1 HYPERTRIGLYCERIDEMIA: Primary | ICD-10-CM

## 2024-09-23 NOTE — TELEPHONE ENCOUNTER
Pt returned call to schedule a follow-up visit. He was last seen on 11/8/23. Scheduled [facility] apt for 11/13. Lab orders placed.

## 2024-11-11 ENCOUNTER — LAB (OUTPATIENT)
Dept: LAB | Facility: LAB | Age: 59
End: 2024-11-11
Payer: COMMERCIAL

## 2024-11-11 DIAGNOSIS — E11.9 DIABETES MELLITUS TYPE 2 WITHOUT RETINOPATHY (MULTI): ICD-10-CM

## 2024-11-11 DIAGNOSIS — E78.1 HYPERTRIGLYCERIDEMIA: ICD-10-CM

## 2024-11-11 LAB
ALBUMIN SERPL BCP-MCNC: 4.5 G/DL (ref 3.4–5)
ALP SERPL-CCNC: 59 U/L (ref 33–136)
ALT SERPL W P-5'-P-CCNC: 40 U/L (ref 10–52)
ANION GAP SERPL CALC-SCNC: 14 MMOL/L (ref 10–20)
AST SERPL W P-5'-P-CCNC: 31 U/L (ref 9–39)
BASOPHILS # BLD AUTO: 0.03 X10*3/UL (ref 0–0.1)
BASOPHILS NFR BLD AUTO: 0.6 %
BILIRUB SERPL-MCNC: 0.5 MG/DL (ref 0–1.2)
BUN SERPL-MCNC: 22 MG/DL (ref 6–23)
CALCIUM SERPL-MCNC: 9.7 MG/DL (ref 8.6–10.6)
CHLORIDE SERPL-SCNC: 101 MMOL/L (ref 98–107)
CHOLEST SERPL-MCNC: 156 MG/DL (ref 0–199)
CHOLESTEROL/HDL RATIO: 3.2
CO2 SERPL-SCNC: 28 MMOL/L (ref 21–32)
CREAT SERPL-MCNC: 0.99 MG/DL (ref 0.5–1.3)
EGFRCR SERPLBLD CKD-EPI 2021: 87 ML/MIN/1.73M*2
EOSINOPHIL # BLD AUTO: 0.15 X10*3/UL (ref 0–0.7)
EOSINOPHIL NFR BLD AUTO: 2.9 %
ERYTHROCYTE [DISTWIDTH] IN BLOOD BY AUTOMATED COUNT: 12.8 % (ref 11.5–14.5)
EST. AVERAGE GLUCOSE BLD GHB EST-MCNC: 186 MG/DL
GLUCOSE SERPL-MCNC: 189 MG/DL (ref 74–99)
HBA1C MFR BLD: 8.1 %
HCT VFR BLD AUTO: 50.2 % (ref 41–52)
HDLC SERPL-MCNC: 48.2 MG/DL
HGB BLD-MCNC: 16.6 G/DL (ref 13.5–17.5)
IMM GRANULOCYTES # BLD AUTO: 0.05 X10*3/UL (ref 0–0.7)
IMM GRANULOCYTES NFR BLD AUTO: 1 % (ref 0–0.9)
LDLC SERPL CALC-MCNC: 51 MG/DL
LYMPHOCYTES # BLD AUTO: 1.04 X10*3/UL (ref 1.2–4.8)
LYMPHOCYTES NFR BLD AUTO: 20 %
MCH RBC QN AUTO: 29 PG (ref 26–34)
MCHC RBC AUTO-ENTMCNC: 33.1 G/DL (ref 32–36)
MCV RBC AUTO: 88 FL (ref 80–100)
MONOCYTES # BLD AUTO: 0.58 X10*3/UL (ref 0.1–1)
MONOCYTES NFR BLD AUTO: 11.1 %
NEUTROPHILS # BLD AUTO: 3.36 X10*3/UL (ref 1.2–7.7)
NEUTROPHILS NFR BLD AUTO: 64.4 %
NON HDL CHOLESTEROL: 108 MG/DL (ref 0–149)
NRBC BLD-RTO: 0 /100 WBCS (ref 0–0)
PLATELET # BLD AUTO: 131 X10*3/UL (ref 150–450)
POTASSIUM SERPL-SCNC: 4.3 MMOL/L (ref 3.5–5.3)
PROT SERPL-MCNC: 7.1 G/DL (ref 6.4–8.2)
RBC # BLD AUTO: 5.72 X10*6/UL (ref 4.5–5.9)
SODIUM SERPL-SCNC: 139 MMOL/L (ref 136–145)
TRIGL SERPL-MCNC: 284 MG/DL (ref 0–149)
VLDL: 57 MG/DL (ref 0–40)
WBC # BLD AUTO: 5.2 X10*3/UL (ref 4.4–11.3)

## 2024-11-11 PROCEDURE — 82570 ASSAY OF URINE CREATININE: CPT

## 2024-11-11 PROCEDURE — 80061 LIPID PANEL: CPT

## 2024-11-11 PROCEDURE — 85025 COMPLETE CBC W/AUTO DIFF WBC: CPT

## 2024-11-11 PROCEDURE — 36415 COLL VENOUS BLD VENIPUNCTURE: CPT

## 2024-11-11 PROCEDURE — 82043 UR ALBUMIN QUANTITATIVE: CPT

## 2024-11-11 PROCEDURE — 80053 COMPREHEN METABOLIC PANEL: CPT

## 2024-11-11 PROCEDURE — 83036 HEMOGLOBIN GLYCOSYLATED A1C: CPT

## 2024-11-12 LAB
CREAT UR-MCNC: 111 MG/DL (ref 20–370)
MICROALBUMIN UR-MCNC: 853.5 MG/L
MICROALBUMIN/CREAT UR: 768.9 UG/MG CREAT

## 2024-11-13 ENCOUNTER — OFFICE VISIT (OUTPATIENT)
Dept: CARDIOLOGY | Facility: HOSPITAL | Age: 59
End: 2024-11-13
Payer: COMMERCIAL

## 2024-11-13 VITALS
HEART RATE: 92 BPM | SYSTOLIC BLOOD PRESSURE: 152 MMHG | BODY MASS INDEX: 31.42 KG/M2 | HEIGHT: 72 IN | DIASTOLIC BLOOD PRESSURE: 102 MMHG | WEIGHT: 232 LBS | OXYGEN SATURATION: 95 %

## 2024-11-13 DIAGNOSIS — E11.10 DM (DIABETES MELLITUS) TYPE 2, UNCONTROLLED, WITH KETOACIDOSIS: ICD-10-CM

## 2024-11-13 DIAGNOSIS — E11.9 DIABETES MELLITUS TYPE 2 WITHOUT RETINOPATHY (MULTI): ICD-10-CM

## 2024-11-13 DIAGNOSIS — E78.5 DYSLIPIDEMIA: ICD-10-CM

## 2024-11-13 PROCEDURE — 3052F HG A1C>EQUAL 8.0%<EQUAL 9.0%: CPT | Performed by: INTERNAL MEDICINE

## 2024-11-13 PROCEDURE — 99215 OFFICE O/P EST HI 40 MIN: CPT | Performed by: INTERNAL MEDICINE

## 2024-11-13 PROCEDURE — 1036F TOBACCO NON-USER: CPT | Performed by: INTERNAL MEDICINE

## 2024-11-13 PROCEDURE — 3008F BODY MASS INDEX DOCD: CPT | Performed by: INTERNAL MEDICINE

## 2024-11-13 PROCEDURE — 3048F LDL-C <100 MG/DL: CPT | Performed by: INTERNAL MEDICINE

## 2024-11-13 PROCEDURE — 3077F SYST BP >= 140 MM HG: CPT | Performed by: INTERNAL MEDICINE

## 2024-11-13 PROCEDURE — RXMED WILLOW AMBULATORY MEDICATION CHARGE

## 2024-11-13 PROCEDURE — 3080F DIAST BP >= 90 MM HG: CPT | Performed by: INTERNAL MEDICINE

## 2024-11-13 PROCEDURE — 3062F POS MACROALBUMINURIA REV: CPT | Performed by: INTERNAL MEDICINE

## 2024-11-13 RX ORDER — REGADENOSON 0.08 MG/ML
0.4 INJECTION, SOLUTION INTRAVENOUS
Status: CANCELLED | OUTPATIENT
Start: 2024-11-13

## 2024-11-13 RX ORDER — DULAGLUTIDE 0.75 MG/.5ML
0.75 INJECTION, SOLUTION SUBCUTANEOUS
Qty: 2 ML | Refills: 1 | Status: SHIPPED | OUTPATIENT
Start: 2024-11-17

## 2024-11-13 RX ORDER — DAPAGLIFLOZIN 10 MG/1
10 TABLET, FILM COATED ORAL DAILY
Qty: 90 TABLET | Refills: 1 | Status: SHIPPED | OUTPATIENT
Start: 2024-11-13 | End: 2025-11-13

## 2024-11-13 RX ORDER — DAPAGLIFLOZIN AND METFORMIN HYDROCHLORIDE 10; 1000 MG/1; MG/1
1 TABLET, FILM COATED, EXTENDED RELEASE ORAL
Qty: 30 TABLET | Refills: 11 | Status: CANCELLED | OUTPATIENT
Start: 2024-11-13

## 2024-11-13 RX ORDER — EPLERENONE 25 MG/1
25 TABLET, FILM COATED ORAL DAILY
Qty: 30 TABLET | Refills: 11 | Status: SHIPPED | OUTPATIENT
Start: 2024-11-13 | End: 2025-11-13

## 2024-11-13 ASSESSMENT — PAIN SCALES - GENERAL: PAINLEVEL_OUTOF10: 6
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
## 2024-11-18 ENCOUNTER — PHARMACY VISIT (OUTPATIENT)
Dept: PHARMACY | Facility: CLINIC | Age: 59
End: 2024-11-18
Payer: MEDICAID

## 2024-11-27 ENCOUNTER — APPOINTMENT (OUTPATIENT)
Dept: PHARMACY | Facility: HOSPITAL | Age: 59
End: 2024-11-27
Payer: COMMERCIAL

## 2024-12-09 ENCOUNTER — APPOINTMENT (OUTPATIENT)
Dept: PHARMACY | Facility: HOSPITAL | Age: 59
End: 2024-12-09
Payer: COMMERCIAL

## 2024-12-09 DIAGNOSIS — E11.10 DM (DIABETES MELLITUS) TYPE 2, UNCONTROLLED, WITH KETOACIDOSIS: ICD-10-CM

## 2024-12-09 DIAGNOSIS — E11.9 DIABETES MELLITUS TYPE 2 WITHOUT RETINOPATHY (MULTI): ICD-10-CM

## 2024-12-09 RX ORDER — LEVOTHYROXINE SODIUM 175 UG/1
175 TABLET ORAL DAILY
COMMUNITY

## 2024-12-09 NOTE — PROGRESS NOTES
Patient is sent at the request of Isaias Lorenzo MD for my opinion regarding Type 2 diabetes.  My final recommendations will be communicated back to the requesting provider by way of shared medical record.    Subjective     Maurice Sterling is a 59 y.o. year old male patient with type two diabetes mellitus, CKD G2/A3, coronary artery disease, class I obesity (BMI 31.46 kg/m2), polysubstance abuse, bipolar disorder and hx of papillary thyroid carcinoma referred for medication management. He completed a follow up visit with Dr. Lorenzo on 11/19/2024. He completed repeat laboratory studies on 11/11/2024 with an elevated A1c at 8.1% improved from previous panel.       Diabetes  He presents for his initial diabetic visit. He has type 2 diabetes mellitus. His disease course has been improving. There are no hypoglycemic associated symptoms. Diabetic complications include heart disease and nephropathy. There is no compliance with monitoring of blood glucose. An ACE inhibitor/angiotensin II receptor blocker is being taken.     Past Medical History:  He has a past medical history of Attention-deficit hyperactivity disorder, predominantly inattentive type (02/25/2022), Body mass index (BMI) 25.0-25.9, adult, Body mass index (BMI) 26.0-26.9, adult, Body mass index (BMI) 29.0-29.9, adult (05/07/2021), Body mass index (BMI) 31.0-31.9, adult (09/24/2021), Body mass index (BMI) 31.0-31.9, adult (10/22/2021), Body mass index (BMI) 32.0-32.9, adult (11/23/2021), Body mass index (BMI) 34.0-34.9, adult (05/21/2021), Body mass index (BMI) 34.0-34.9, adult, Body mass index (BMI)30.0-30.9, adult (07/28/2022), Body mass index (BMI)30.0-30.9, adult (02/25/2022), Calculus of kidney (08/23/2018), Cervicalgia (08/14/2018), Cervicalgia (08/14/2018), Chills (without fever) (08/14/2018), Dorsalgia, unspecified (11/16/2015), Encounter for issue of repeat prescription (05/07/2021), Encounter for other preprocedural examination (02/15/2022), Lower  abdominal pain, unspecified (09/24/2021), Obesity, unspecified (06/07/2022), Otalgia, right ear (07/28/2022), Other chest pain (05/13/2022), Other conditions influencing health status (10/31/2019), Other conditions influencing health status, Other intervertebral disc degeneration, lumbosacral region (11/16/2015), Pain in left shoulder (10/04/2022), Personal history of (healed) traumatic fracture (11/09/2022), Personal history of (healed) traumatic fracture (11/09/2022), Personal history of (healed) traumatic fracture (11/09/2022), Personal history of (healed) traumatic fracture (11/09/2022), Personal history of Methicillin resistant Staphylococcus aureus infection (12/29/2016), Personal history of other (healed) physical injury and trauma (11/09/2022), Personal history of other diseases of the digestive system (10/14/2021), Personal history of other diseases of the musculoskeletal system and connective tissue (06/12/2018), Personal history of other diseases of the musculoskeletal system and connective tissue (11/09/2022), Personal history of other diseases of the nervous system and sense organs (07/28/2022), Personal history of other diseases of the nervous system and sense organs (08/01/2022), Personal history of other endocrine, nutritional and metabolic disease (09/26/2022), Personal history of other endocrine, nutritional and metabolic disease (01/09/2020), Personal history of other endocrine, nutritional and metabolic disease (08/14/2018), Personal history of other endocrine, nutritional and metabolic disease, Personal history of other infectious and parasitic diseases (09/20/2019), Personal history of other specified conditions (06/07/2022), Poisoning by 4-aminophenol derivatives, accidental (unintentional), initial encounter (06/12/2018), Radiculopathy, cervical region (08/06/2018), Radiculopathy, cervical region (08/14/2018), Sepsis, unspecified organism (Multi), Synovial cyst of popliteal space ([place]),  right knee (11/30/2016), Unspecified disorder of eyelid (08/22/2018), Unspecified fracture of shaft of unspecified tibia, initial encounter for closed fracture (05/21/2021), Unspecified fracture of unspecified calcaneus, initial encounter for closed fracture (11/09/2022), Unspecified injury of head, subsequent encounter (01/09/2020), and Unspecified injury of neck, initial encounter (06/12/2018).    Past Surgical History:  He has a past surgical history that includes Thyroid surgery (11/16/2015); Other surgical history (11/09/2022); Hernia repair (05/21/2021); Other surgical history (09/20/2019); Other surgical history (09/20/2019); Other surgical history (05/19/2022); CT angio coronary art with heartflow if score >30% (3/21/2022); CT guided percutaneous peritoneal or retroperitoneal fluid collection drainage (8/22/2019); and CT guided chest tube placement (8/21/2019).    Social History:  He reports that he quit smoking about 27 years ago. His smoking use included cigarettes. He has never used smokeless tobacco. He reports current alcohol use of about 9.0 standard drinks of alcohol per week. He reports current drug use. Drug: Marijuana.    Family History:  Family History   Problem Relation Name Age of Onset    Diabetes Father      Other (CARDIAC DISORDER) Father      Arthritis Other FAMILY HISTORY     Psoriasis Other FAMILY HISTORY      Allergies:  Prednisone    DIABETES MELLITUS TYPE 2:    Does patient follow with Endocrinology: No  Does patient follow with Nephrology: No  Does pt have proteinuria? Yes    Lifestyle:  Diet: 3 meals/day   Drinks: Orange juice, pineapple juice.   Avoidance of carbohydrates: No   Physical Activity:   ADLs  Housework     Tobacco Use: None  Alcohol: Coffee with rum (2 shots) or 2 tall cans of beer 5-6 days per week   Illicit Drug Use: Smoking Marijuana - Daily     Current diabetic medications include:  Glimepiride 4 mg: Take one tablet by mouth twice daily.  Trulicity 0.75 mg: Inject  0.75 mg under the skin once weekly on Sundays (Doses remainin; Last injection completed on 2024)  Farxiga 10 mg: Take one tablet by mouth once daily in the morning    Historical DM pharmacotherapy:  Metformin use - Unknown per patient  Xigduo Xr 10/1000 mg - Therapy previously prescribed    Clarifications to above regimen: Xigduo Xr - not taking.     Current hypertension medications include:  Metoprolol succinate 100 mg: Take one tablet by mouth once daily  Lisinopril 40 mg: Take one tablet by mouth once daily     Current hyperlipidemia medications include:  Ezetimibe 10 mg: Take one tablet by mouth once daily   Rosuvastatin 20 mg: Take one tablet by mouth once daily     Adverse Effects: Nausea without vomiting or diarrhea. Denies constipation.     Adherence/Organization:  - AEMR Fill History Reviewed: yes  - How often on an average per week does the patient forget to take a dose of their medications? None. Reports 1 missed dose of medication every 3 months.   - Do you utilize auto-fill services? No  - Missed doses: Reports 1 missed dose of any medication once every 3 months.   - Do you use a pillbox at home?  yes - Once weekly  - Is it weekly or monthly? Weekly   - Do you prepare your own pillbox? Yes    Glucose Readings:  Glucometer/CGM Type: None    Current home BG readings: None    Previous home BG readings: N/A     Any episodes of hypoglycemia? N/A.      Secondary Prevention:  Statin? Yes  ACE-I/ARB? Yes  Aspirin? Yes    Pertinent PMH Review  PMH of Pancreatitis: No  PMH of Retinopathy: No  PMH of Recurrent Urinary Tract Infections: No   PMH of MTC/MEN type II: No    Drug Interactions:  Eplerenone + Lisinopril: Monitor potassium, enhanced hyperkalemic effect when agents are combined.    Objective     Last Recorded Vitals:  BP Readings from Last 6 Encounters:   24 (!) 152/102   24 (!) 178/115   23 (!) 147/103   23 (!) 158/100   10/10/23 (!) 151/101   23 130/85      Wt  Readings from Last 6 Encounters:   11/13/24 105 kg (232 lb)   06/27/24 104 kg (230 lb)   11/08/23 107 kg (236 lb 12.8 oz)   11/03/23 108 kg (238 lb)   10/10/23 110 kg (242 lb 8.1 oz)   07/07/23 109 kg (240 lb 6 oz)     BMI Readings from Last 6 Encounters:   11/13/24 31.46 kg/m²   06/27/24 31.19 kg/m²   11/08/23 32.12 kg/m²   11/03/23 32.28 kg/m²   10/10/23 32.89 kg/m²   07/07/23 29.26 kg/m²     Lab Review  Lab Results   Component Value Date    BILITOT 0.5 11/11/2024    CALCIUM 9.7 11/11/2024    CO2 28 11/11/2024     11/11/2024    CREATININE 0.99 11/11/2024    GLUCOSE 189 (H) 11/11/2024    ALKPHOS 59 11/11/2024    K 4.3 11/11/2024    PROT 7.1 11/11/2024     11/11/2024    AST 31 11/11/2024    ALT 40 11/11/2024    BUN 22 11/11/2024    ANIONGAP 14 11/11/2024    MG 1.75 11/23/2021    PHOS 2.4 (L) 02/04/2020     08/20/2019    ALBUMIN 4.5 11/11/2024    AMYLASE 70 12/31/2020    LIPASE 18 04/05/2021    GFRMALE 70 05/09/2023     Lab Results   Component Value Date    TRIG 284 (H) 11/11/2024    CHOL 156 11/11/2024    LDLCALC 51 11/11/2024    HDL 48.2 11/11/2024     Lab Results   Component Value Date    HGBA1C 8.1 (H) 11/11/2024    HGBA1C 8.4 (H) 11/07/2023    HGBA1C 9.0 (A) 11/03/2023     Component      Latest Ref Rng 5/9/2023 11/7/2023 11/11/2024   ALBUMIN (MG/L) IN URINE      Not Established mg/L 187.7      Albumin/Creatinine Ratio      <30.0 ug/mg Creat 460.0 (H)  964.0 (H)  768.9 (H)    Creatinine, Urine Random      20.0 - 370.0 mg/dL 40.8  74.2  111.0    Albumin, Urine Random      Not established mg/L  715.3  853.5       Legend:  (H) High    The ASCVD Risk score (Jonathan DENTON, et al., 2019) failed to calculate for the following reasons:    Risk score cannot be calculated because patient has a medical history suggesting prior/existing ASCVD    Assessment/Plan   Problem List Items Addressed This Visit       Diabetes mellitus type 2 without retinopathy (Multi)     Patients diabetes is uncontrolled and above  goal with most recent A1c of 8.1% on 2024 improved from 8.4% on 2023 (Goal < 7%). Patient exhibits proteinuria despite prescribed therapy with maximally dosed Lisinopril and Farxiga 10 mg. He reports adherence to currently prescribed therapy for TDM2 including Trulicity 0.75 mg, Farxiga 10 mg and glimepiride 4 mg BID. He is not monitoring his glucose at home. He reports nausea with vomiting or diarrhea. Denies constipation. His medication list was reconciled during the encounter. Will discuss titration of Trulicity therapy with follow-up visit.          Relevant Medications    dulaglutide (Trulicity) 0.75 mg/0.5 mL pen injector    Other Relevant Orders    Referral to Clinical Pharmacy     Other Visit Diagnoses       DM (diabetes mellitus) type 2, uncontrolled, with ketoacidosis        Relevant Medications    dapagliflozin propanediol (Farxiga) 10 mg          Compliance at present is estimated to be fair.  Medication list reconciled during encounter:   Out of refills: Rosuvastatin 20 mg (Therapy ), Metoprolol Succinate 100 mg (Prescription ), Lisinopril 40 mg (Prescription set to )  Not taking: Xigduo Xr 10/1000 mg -> Taking Farxiga 10 mg daily    Follow-up: I recommend diabetes care be 1 month.  Cardiology Follow-Up: 2025    Continue all meds under the continuation of care with the referring provider and clinical pharmacy team.

## 2024-12-09 NOTE — Clinical Note
Good morning,  From this patient's visit I noticed his Metoprolol succinate, Rosuvastatin and Lisinopril were set to  or already . Can refills please be sent in? Additionally, refills were sent for Trulicity 0.75 mg and Farxiga 10 mg. Xigduo Xr discontinued as patient reported not taking. When better rapport and established follow-ups are consistent will increase Trulicity to 1.5 mg.   Thank you.

## 2024-12-23 PROCEDURE — RXMED WILLOW AMBULATORY MEDICATION CHARGE

## 2024-12-23 RX ORDER — DAPAGLIFLOZIN 10 MG/1
10 TABLET, FILM COATED ORAL DAILY
Qty: 90 TABLET | Refills: 1 | Status: SHIPPED | OUTPATIENT
Start: 2024-12-23 | End: 2025-12-23

## 2024-12-23 RX ORDER — DULAGLUTIDE 0.75 MG/.5ML
0.75 INJECTION, SOLUTION SUBCUTANEOUS
Qty: 2 ML | Refills: 1 | Status: SHIPPED | OUTPATIENT
Start: 2024-12-23

## 2024-12-23 NOTE — ASSESSMENT & PLAN NOTE
Patients diabetes is uncontrolled and above goal with most recent A1c of 8.1% on 11/11/2024 improved from 8.4% on 11/7/2023 (Goal < 7%). Patient exhibits proteinuria despite prescribed therapy with maximally dosed Lisinopril and Farxiga 10 mg. He reports adherence to currently prescribed therapy for TDM2 including Trulicity 0.75 mg, Farxiga 10 mg and glimepiride 4 mg BID. He is not monitoring his glucose at home. He reports nausea with vomiting or diarrhea. Denies constipation. His medication list was reconciled during the encounter. Will discuss titration of Trulicity therapy with follow-up visit.

## 2024-12-24 ENCOUNTER — PHARMACY VISIT (OUTPATIENT)
Dept: PHARMACY | Facility: CLINIC | Age: 59
End: 2024-12-24
Payer: MEDICAID

## 2024-12-30 ENCOUNTER — DOCUMENTATION (OUTPATIENT)
Dept: CARDIOLOGY | Facility: CLINIC | Age: 59
End: 2024-12-30
Payer: COMMERCIAL

## 2024-12-30 DIAGNOSIS — R80.9 PROTEINURIA, UNSPECIFIED: Primary | ICD-10-CM

## 2024-12-30 DIAGNOSIS — I25.118 CORONARY ARTERY DISEASE OF NATIVE ARTERY OF NATIVE HEART WITH STABLE ANGINA PECTORIS: Primary | ICD-10-CM

## 2024-12-30 DIAGNOSIS — I20.9 AP (ANGINA PECTORIS) (CMS-HCC): ICD-10-CM

## 2025-01-06 ENCOUNTER — HOSPITAL ENCOUNTER (OUTPATIENT)
Dept: RADIOLOGY | Facility: HOSPITAL | Age: 60
End: 2025-01-06
Payer: COMMERCIAL

## 2025-01-06 DIAGNOSIS — R80.9 PROTEINURIA, UNSPECIFIED: Primary | ICD-10-CM

## 2025-01-07 ENCOUNTER — LAB (OUTPATIENT)
Dept: LAB | Facility: LAB | Age: 60
End: 2025-01-07
Payer: COMMERCIAL

## 2025-01-07 ENCOUNTER — HOSPITAL ENCOUNTER (OUTPATIENT)
Dept: CARDIOLOGY | Facility: CLINIC | Age: 60
Discharge: HOME | End: 2025-01-07
Payer: COMMERCIAL

## 2025-01-07 DIAGNOSIS — R80.9 PROTEINURIA, UNSPECIFIED: ICD-10-CM

## 2025-01-07 DIAGNOSIS — I20.9 AP (ANGINA PECTORIS) (CMS-HCC): Primary | ICD-10-CM

## 2025-01-07 DIAGNOSIS — I25.118 CORONARY ARTERY DISEASE OF NATIVE ARTERY OF NATIVE HEART WITH STABLE ANGINA PECTORIS: ICD-10-CM

## 2025-01-07 DIAGNOSIS — I20.9 AP (ANGINA PECTORIS) (CMS-HCC): ICD-10-CM

## 2025-01-07 DIAGNOSIS — E11.10 DM (DIABETES MELLITUS) TYPE 2, UNCONTROLLED, WITH KETOACIDOSIS: Primary | ICD-10-CM

## 2025-01-07 LAB
ALBUMIN SERPL BCP-MCNC: 4.8 G/DL (ref 3.4–5)
ANION GAP SERPL CALCULATED.3IONS-SCNC: 12 MMOL/L (ref 10–20)
APPEARANCE UR: CLEAR
BILIRUB UR STRIP.AUTO-MCNC: NEGATIVE MG/DL
BUN SERPL-MCNC: 22 MG/DL (ref 6–23)
C3 SERPL-MCNC: 134 MG/DL (ref 87–200)
C4 SERPL-MCNC: 33 MG/DL (ref 10–50)
CALCIUM SERPL-MCNC: 9.6 MG/DL (ref 8.6–10.3)
CHLORIDE SERPL-SCNC: 104 MMOL/L (ref 98–107)
CO2 SERPL-SCNC: 25 MMOL/L (ref 21–32)
COLOR UR: ABNORMAL
CREAT SERPL-MCNC: 1 MG/DL (ref 0.5–1.3)
CREAT UR-MCNC: 94.9 MG/DL (ref 20–370)
EGFRCR SERPLBLD CKD-EPI 2021: 86 ML/MIN/1.73M*2
ERYTHROCYTE [DISTWIDTH] IN BLOOD BY AUTOMATED COUNT: 12.6 % (ref 11.5–14.5)
GLUCOSE SERPL-MCNC: 135 MG/DL (ref 74–99)
GLUCOSE UR STRIP.AUTO-MCNC: ABNORMAL MG/DL
HCT VFR BLD AUTO: 48.8 % (ref 41–52)
HGB BLD-MCNC: 16.6 G/DL (ref 13.5–17.5)
KETONES UR STRIP.AUTO-MCNC: NEGATIVE MG/DL
LEUKOCYTE ESTERASE UR QL STRIP.AUTO: NEGATIVE
MCH RBC QN AUTO: 28.9 PG (ref 26–34)
MCHC RBC AUTO-ENTMCNC: 34 G/DL (ref 32–36)
MCV RBC AUTO: 85 FL (ref 80–100)
MUCOUS THREADS #/AREA URNS AUTO: NORMAL /LPF
NITRITE UR QL STRIP.AUTO: NEGATIVE
NRBC BLD-RTO: 0 /100 WBCS (ref 0–0)
PH UR STRIP.AUTO: 5.5 [PH]
PHOSPHATE SERPL-MCNC: 3.9 MG/DL (ref 2.5–4.9)
PLATELET # BLD AUTO: 143 X10*3/UL (ref 150–450)
POTASSIUM SERPL-SCNC: 4.1 MMOL/L (ref 3.5–5.3)
PROT SERPL-MCNC: 7.4 G/DL (ref 6.4–8.2)
PROT UR STRIP.AUTO-MCNC: ABNORMAL MG/DL
PROT UR-ACNC: 89 MG/DL (ref 5–25)
RBC # BLD AUTO: 5.74 X10*6/UL (ref 4.5–5.9)
RBC # UR STRIP.AUTO: ABNORMAL /UL
RBC #/AREA URNS AUTO: NORMAL /HPF
SODIUM SERPL-SCNC: 137 MMOL/L (ref 136–145)
SP GR UR STRIP.AUTO: 1.04
UROBILINOGEN UR STRIP.AUTO-MCNC: NORMAL MG/DL
WBC # BLD AUTO: 6.9 X10*3/UL (ref 4.4–11.3)
WBC #/AREA URNS AUTO: NORMAL /HPF

## 2025-01-07 PROCEDURE — 85027 COMPLETE CBC AUTOMATED: CPT

## 2025-01-07 PROCEDURE — 93018 CV STRESS TEST I&R ONLY: CPT | Performed by: INTERNAL MEDICINE

## 2025-01-07 PROCEDURE — 81001 URINALYSIS AUTO W/SCOPE: CPT

## 2025-01-07 PROCEDURE — 80069 RENAL FUNCTION PANEL: CPT

## 2025-01-07 PROCEDURE — 2500000004 HC RX 250 GENERAL PHARMACY W/ HCPCS (ALT 636 FOR OP/ED): Performed by: INTERNAL MEDICINE

## 2025-01-07 PROCEDURE — 93350 STRESS TTE ONLY: CPT | Performed by: INTERNAL MEDICINE

## 2025-01-07 PROCEDURE — 93017 CV STRESS TEST TRACING ONLY: CPT

## 2025-01-07 PROCEDURE — 93016 CV STRESS TEST SUPVJ ONLY: CPT | Performed by: INTERNAL MEDICINE

## 2025-01-07 PROCEDURE — 82570 ASSAY OF URINE CREATININE: CPT

## 2025-01-07 RX ORDER — HYDRALAZINE HYDROCHLORIDE 25 MG/1
25 TABLET, FILM COATED ORAL 2 TIMES DAILY
Qty: 180 TABLET | Refills: 3 | Status: SHIPPED | OUTPATIENT
Start: 2025-01-07 | End: 2026-01-07

## 2025-01-07 RX ADMIN — PERFLUTREN 10 ML OF DILUTION: 6.52 INJECTION, SUSPENSION INTRAVENOUS at 11:22

## 2025-01-07 NOTE — PROGRESS NOTES
Was seen for a stress echocardiogram today found to be markedly hypertensive stop taking the recommended eplerenone due to perceived side effects.  I was initially going to add amlodipine but he said he had side effects to that in the past as well so I suggested adding hydralazine 25 twice a day the stress test was otherwise negative for inducible ischemia at a reasonable workload

## 2025-01-08 ENCOUNTER — APPOINTMENT (OUTPATIENT)
Dept: NUTRITION | Facility: HOSPITAL | Age: 60
End: 2025-01-08
Payer: COMMERCIAL

## 2025-01-08 LAB — ANA SER QL HEP2 SUBST: NEGATIVE

## 2025-01-10 LAB
ALBUMIN MFR UR ELPH: 85.6 %
ALBUMIN: 4.6 G/DL (ref 3.4–5)
ALPHA 1 GLOBULIN: 0.2 G/DL (ref 0.2–0.6)
ALPHA 2 GLOBULIN: 0.7 G/DL (ref 0.4–1.1)
ALPHA1 GLOB MFR UR ELPH: 1.5 %
ALPHA2 GLOB MFR UR ELPH: 2.8 %
B-GLOBULIN MFR UR ELPH: 5.6 %
BETA GLOBULIN: 0.9 G/DL (ref 0.5–1.2)
GAMMA GLOB MFR UR ELPH: 4.5 %
GAMMA GLOBULIN: 1 G/DL (ref 0.5–1.4)
IMMUNOFIXATION COMMENT: NORMAL
IMMUNOFIXATION COMMENT: NORMAL
PATH REVIEW - SERUM IMMUNOFIXATION: NORMAL
PATH REVIEW - URINE IMMUNOFIXATION: NORMAL
PATH REVIEW-SERUM PROTEIN ELECTROPHORESIS: NORMAL
PATH REVIEW-URINE PROTEIN ELECTROPHORESIS: NORMAL
PROTEIN ELECTROPHORESIS COMMENT: NORMAL
URINE ELECTROPHORESIS COMMENT: NORMAL

## 2025-01-20 ENCOUNTER — APPOINTMENT (OUTPATIENT)
Dept: PHARMACY | Facility: HOSPITAL | Age: 60
End: 2025-01-20
Payer: COMMERCIAL

## 2025-01-20 DIAGNOSIS — E11.49 TYPE 2 DIABETES MELLITUS WITH OTHER DIABETIC NEUROLOGICAL COMPLICATION: ICD-10-CM

## 2025-01-20 DIAGNOSIS — E11.9 DIABETES MELLITUS TYPE 2 WITHOUT RETINOPATHY (MULTI): ICD-10-CM

## 2025-01-20 DIAGNOSIS — I10 ESSENTIAL (PRIMARY) HYPERTENSION: ICD-10-CM

## 2025-01-20 NOTE — PROGRESS NOTES
Patient is sent at the request of Isaias Lorenzo MD for my opinion regarding Type 2 diabetes.  My final recommendations will be communicated back to the requesting provider by way of shared medical record.    Subjective     Maurice Sterling is a 59 y.o. year old male patient with type two diabetes mellitus, CKD G2/A3, coronary artery disease, class I obesity (BMI 31.46 kg/m2), polysubstance abuse, bipolar disorder and hx of papillary thyroid carcinoma referred for medication management. He completed a follow up visit with Dr. Lorenzo on 11/19/2024. He completed repeat laboratory studies on 11/11/2024 with an elevated A1c at 8.1% improved from previous panel.       Diabetes  He presents for his initial diabetic visit. He has type 2 diabetes mellitus. His disease course has been improving. There are no hypoglycemic associated symptoms. Diabetic complications include heart disease and nephropathy. There is no compliance with monitoring of blood glucose. An ACE inhibitor/angiotensin II receptor blocker is being taken.     Past Medical History:  He has a past medical history of Attention-deficit hyperactivity disorder, predominantly inattentive type (02/25/2022), Body mass index (BMI) 25.0-25.9, adult, Body mass index (BMI) 26.0-26.9, adult, Body mass index (BMI) 29.0-29.9, adult (05/07/2021), Body mass index (BMI) 31.0-31.9, adult (09/24/2021), Body mass index (BMI) 31.0-31.9, adult (10/22/2021), Body mass index (BMI) 32.0-32.9, adult (11/23/2021), Body mass index (BMI) 34.0-34.9, adult (05/21/2021), Body mass index (BMI) 34.0-34.9, adult, Body mass index (BMI)30.0-30.9, adult (07/28/2022), Body mass index (BMI)30.0-30.9, adult (02/25/2022), Calculus of kidney (08/23/2018), Cervicalgia (08/14/2018), Cervicalgia (08/14/2018), Chills (without fever) (08/14/2018), Dorsalgia, unspecified (11/16/2015), Encounter for issue of repeat prescription (05/07/2021), Encounter for other preprocedural examination (02/15/2022), Lower  abdominal pain, unspecified (09/24/2021), Obesity, unspecified (06/07/2022), Otalgia, right ear (07/28/2022), Other chest pain (05/13/2022), Other conditions influencing health status (10/31/2019), Other conditions influencing health status, Other intervertebral disc degeneration, lumbosacral region (11/16/2015), Pain in left shoulder (10/04/2022), Personal history of (healed) traumatic fracture (11/09/2022), Personal history of (healed) traumatic fracture (11/09/2022), Personal history of (healed) traumatic fracture (11/09/2022), Personal history of (healed) traumatic fracture (11/09/2022), Personal history of Methicillin resistant Staphylococcus aureus infection (12/29/2016), Personal history of other (healed) physical injury and trauma (11/09/2022), Personal history of other diseases of the digestive system (10/14/2021), Personal history of other diseases of the musculoskeletal system and connective tissue (06/12/2018), Personal history of other diseases of the musculoskeletal system and connective tissue (11/09/2022), Personal history of other diseases of the nervous system and sense organs (07/28/2022), Personal history of other diseases of the nervous system and sense organs (08/01/2022), Personal history of other endocrine, nutritional and metabolic disease (09/26/2022), Personal history of other endocrine, nutritional and metabolic disease (01/09/2020), Personal history of other endocrine, nutritional and metabolic disease (08/14/2018), Personal history of other endocrine, nutritional and metabolic disease, Personal history of other infectious and parasitic diseases (09/20/2019), Personal history of other specified conditions (06/07/2022), Poisoning by 4-aminophenol derivatives, accidental (unintentional), initial encounter (06/12/2018), Radiculopathy, cervical region (08/06/2018), Radiculopathy, cervical region (08/14/2018), Sepsis, unspecified organism (Multi), Synovial cyst of popliteal space ([place]),  "right knee (11/30/2016), Unspecified disorder of eyelid (08/22/2018), Unspecified fracture of shaft of unspecified tibia, initial encounter for closed fracture (05/21/2021), Unspecified fracture of unspecified calcaneus, initial encounter for closed fracture (11/09/2022), Unspecified injury of head, subsequent encounter (01/09/2020), and Unspecified injury of neck, initial encounter (06/12/2018).    Past Surgical History:  He has a past surgical history that includes Thyroid surgery (11/16/2015); Other surgical history (11/09/2022); Hernia repair (05/21/2021); Other surgical history (09/20/2019); Other surgical history (09/20/2019); Other surgical history (05/19/2022); CT angio coronary art with heartflow if score >30% (3/21/2022); CT guided percutaneous peritoneal or retroperitoneal fluid collection drainage (8/22/2019); and CT guided chest tube placement (8/21/2019).    Social History:  He reports that he quit smoking about 28 years ago. His smoking use included cigarettes. He has never used smokeless tobacco. He reports current alcohol use of about 9.0 standard drinks of alcohol per week. He reports current drug use. Drug: Marijuana.    Family History:  Family History   Problem Relation Name Age of Onset    Diabetes Father      Other (CARDIAC DISORDER) Father      Arthritis Other FAMILY HISTORY     Psoriasis Other FAMILY HISTORY      Allergies:  Prednisone    DIABETES MELLITUS TYPE 2:    Does patient follow with Endocrinology: No  Does patient follow with Nephrology: No  Does pt have proteinuria? Yes    Lifestyle:  Diet: 3 meals/day   Fried potatoes, camarena, eggs, toast  Orange juice and water  Avoidance of carbohydrates: No   Physical Activity:   ADLs  Housework     Tobacco Use: None  Alcohol: Coffee with rum (1-2 shots) or 2 tall cans of beer \"couple times per week\"  Illicit Drug Use: Smoking Marijuana - \"couple times per week\"    Current diabetic medications include:  Glimepiride 4 mg: Take one tablet by mouth " twice daily.  Trulicity 0.75 mg: Inject 0.75 mg under the skin once weekly on  (Doses remainin; Last injection completed on 2025)  Farxiga 10 mg: Take one tablet by mouth once daily in the morning    Historical DM pharmacotherapy:  Metformin use - Unknown per patient  Xigduo Xr 10/1000 mg - Therapy previously prescribed    Current hypertension medications include:  Metoprolol succinate 100 mg: Take one tablet by mouth once daily  Lisinopril 40 mg: Take one tablet by mouth once daily   Eplerenone 25 mg: Take one tablet by mouth once daily    Current hyperlipidemia medications include:  Ezetimibe 10 mg: Take one tablet by mouth once daily (Not taking)  Rosuvastatin 20 mg: Take one tablet by mouth once daily     Clarifications to above regimen:   Ezetimibe 10 mg- Not taking  Rosuvastatin 20 mg 8 tablets remaining  Lisinopril 40 mg (rx ); eplenone 7 tablets remaining    Adverse Effects:   Nausea without vomiting or diarrhea. Denies constipation.     Adherence/Organization:  - AEMR Fill History Reviewed: yes  - How often on an average per week does the patient forget to take a dose of their medications? None. Reports 1 missed dose of oral medication in the past 30 days.   - Do you utilize auto-fill services? No  - Missed doses: Reports 1 missed dose of any medication once every 3 months.   - Do you use a pillbox at home?  yes - Once weekly  - Is it weekly or monthly? Weekly   - Do you prepare your own pillbox? Yes    Glucose Readings:  Glucometer/CGM Type: None  CGM monitoring supplies - None    No current glucose monitoring supplies       Current home BG readings: None    Previous home BG readings: N/A     Any episodes of hypoglycemia? N/A.      Blood pressure Readings:  Monitor: Upper Arm Cuff  Monitoring Frequency: 1-2 times per day x 7 days    Patient reports from memory a reading 190/110 mmHg which he felt was not actually high    Advised to provide a reading during our telehealth visit:  173/111 mmHg (Left arm, sitting) ; 131/87 mmHg (Left arm, sitting) ; 147/100 mmHg (right arm, sitting) ; 149/96 mmHg (right arm, sitting)  Timing of antihypertensive administration: 10 am     Denies coffee, caffeine or stimulant use    Secondary Prevention:  Statin? Yes  ACE-I/ARB? Yes  Aspirin? Yes    Pertinent PMH Review  PMH of Pancreatitis: No  PMH of Retinopathy: No  PMH of Recurrent Urinary Tract Infections: No   PMH of MTC/MEN type II: No    Drug Interactions:  Eplerenone + Lisinopril: Monitor potassium, enhanced hyperkalemic effect when agents are combined.    Objective     Last Recorded Vitals:  BP Readings from Last 6 Encounters:   11/13/24 (!) 152/102   06/28/24 (!) 178/115   11/08/23 (!) 147/103   11/03/23 (!) 158/100   10/10/23 (!) 151/101   07/07/23 130/85      Wt Readings from Last 6 Encounters:   11/13/24 105 kg (232 lb)   06/27/24 104 kg (230 lb)   11/08/23 107 kg (236 lb 12.8 oz)   11/03/23 108 kg (238 lb)   10/10/23 110 kg (242 lb 8.1 oz)   07/07/23 109 kg (240 lb 6 oz)     BMI Readings from Last 6 Encounters:   11/13/24 31.46 kg/m²   06/27/24 31.19 kg/m²   11/08/23 32.12 kg/m²   11/03/23 32.28 kg/m²   10/10/23 32.89 kg/m²   07/07/23 29.26 kg/m²     Lab Review  Lab Results   Component Value Date    BILITOT 0.5 11/11/2024    CALCIUM 9.6 01/07/2025    CO2 25 01/07/2025     01/07/2025    CREATININE 1.00 01/07/2025    GLUCOSE 135 (H) 01/07/2025    ALKPHOS 59 11/11/2024    K 4.1 01/07/2025    PROT 7.4 01/07/2025     01/07/2025    AST 31 11/11/2024    ALT 40 11/11/2024    BUN 22 01/07/2025    ANIONGAP 12 01/07/2025    MG 1.75 11/23/2021    PHOS 3.9 01/07/2025     08/20/2019    ALBUMIN 4.8 01/07/2025    AMYLASE 70 12/31/2020    LIPASE 18 04/05/2021    GFRMALE 70 05/09/2023     Lab Results   Component Value Date    TRIG 284 (H) 11/11/2024    CHOL 156 11/11/2024    LDLCALC 51 11/11/2024    HDL 48.2 11/11/2024     Lab Results   Component Value Date    HGBA1C 8.1 (H) 11/11/2024    HGBA1C  8.4 (H) 11/07/2023    HGBA1C 9.0 (A) 11/03/2023     Component      Latest Ref Rng 5/9/2023 11/7/2023 11/11/2024   ALBUMIN (MG/L) IN URINE      Not Established mg/L 187.7      Albumin/Creatinine Ratio      <30.0 ug/mg Creat 460.0 (H)  964.0 (H)  768.9 (H)    Creatinine, Urine Random      20.0 - 370.0 mg/dL 40.8  74.2  111.0    Albumin, Urine Random      Not established mg/L  715.3  853.5       Legend:  (H) High    The ASCVD Risk score (Jonathan DENTON, et al., 2019) failed to calculate for the following reasons:    Risk score cannot be calculated because patient has a medical history suggesting prior/existing ASCVD    Assessment/Plan   Problem List Items Addressed This Visit       Diabetes mellitus type 2 without retinopathy (Multi)     Patients diabetes is uncontrolled and above goal with most recent A1c of 8.1% on 11/11/2024 improved from 8.4% on 11/7/2023 (Goal < 7%). Patient exhibits proteinuria despite prescribed therapy with maximally dosed Lisinopril and Farxiga 10 mg. He reports adherence to currently prescribed therapy for TDM2 including Trulicity 0.75 mg, Farxiga 10 mg and glimepiride 4 mg BID. He is not monitoring his glucose at home. He denies N/V/D. Denies constipation. His medication list was reconciled during the encounter. Today I advised a dose increase in his Trulicity to the 1.5 mg dose. He will continue with his antihyperglycemic agents as prescribed otherwise. Testing supplies will be sent in.         Relevant Medications    glimepiride (Amaryl) 4 mg tablet    eplerenone (Inspra) 25 mg tablet    dulaglutide (Trulicity) 1.5 mg/0.5 mL pen injector injection    blood sugar diagnostic (OneTouch Ultra Test) strip    lancets (OneTouch Delica Plus Lancet) 33 gauge misc    blood-glucose meter misc    Other Relevant Orders    Referral to Clinical Pharmacy     Other Visit Diagnoses       Essential (primary) hypertension        Relevant Medications    lisinopril 40 mg tablet    Other Relevant Orders    Referral  to Clinical Pharmacy    Type 2 diabetes mellitus with other diabetic neurological complication        Relevant Medications    rosuvastatin (Crestor) 20 mg tablet    Other Relevant Orders    Referral to Clinical Pharmacy          Compliance at present is estimated to be fair.  Discussed proper blood pressure monitoring techinques during the encounter. Advised to continue with home blood pressure monitoring and to provide a list of his home readings to his follow up cardiology visit  Medication list reconciled during encounter:   Out of refills: Rosuvastatin 20 mg (Therapy ), Metoprolol Succinate 100 mg (Prescription ), Lisinopril 40 mg (Prescription set to )  Not taking: Xigduo Xr 10/1000 mg -> Taking Farxiga 10 mg daily    Follow-up: I recommend diabetes care be 2025 at 1:20 pm .  Cardiology Follow-Up: 2025    Continue all meds under the continuation of care with the referring provider and clinical pharmacy team.

## 2025-01-22 ENCOUNTER — PHARMACY VISIT (OUTPATIENT)
Dept: PHARMACY | Facility: CLINIC | Age: 60
End: 2025-01-22
Payer: MEDICAID

## 2025-01-22 PROCEDURE — RXMED WILLOW AMBULATORY MEDICATION CHARGE

## 2025-01-22 RX ORDER — DEXTROSE 4 G
TABLET,CHEWABLE ORAL
Qty: 1 EACH | Refills: 0 | Status: SHIPPED | OUTPATIENT
Start: 2025-01-22

## 2025-01-22 RX ORDER — ROSUVASTATIN CALCIUM 20 MG/1
20 TABLET, COATED ORAL DAILY
Qty: 90 TABLET | Refills: 3 | Status: SHIPPED | OUTPATIENT
Start: 2025-01-22 | End: 2026-01-22

## 2025-01-22 RX ORDER — LISINOPRIL 40 MG/1
40 TABLET ORAL DAILY
Qty: 90 TABLET | Refills: 3 | Status: SHIPPED | OUTPATIENT
Start: 2025-01-22 | End: 2026-01-17

## 2025-01-22 RX ORDER — BLOOD SUGAR DIAGNOSTIC
STRIP MISCELLANEOUS
Qty: 200 EACH | Refills: 3 | Status: SHIPPED | OUTPATIENT
Start: 2025-01-22

## 2025-01-22 RX ORDER — DULAGLUTIDE 1.5 MG/.5ML
1.5 INJECTION, SOLUTION SUBCUTANEOUS WEEKLY
Qty: 2 ML | Refills: 11 | Status: SHIPPED | OUTPATIENT
Start: 2025-01-22

## 2025-01-22 RX ORDER — EPLERENONE 25 MG/1
25 TABLET, FILM COATED ORAL DAILY
Qty: 30 TABLET | Refills: 11 | Status: SHIPPED | OUTPATIENT
Start: 2025-01-22 | End: 2026-01-22

## 2025-01-22 RX ORDER — LANCETS 33 GAUGE
EACH MISCELLANEOUS
Qty: 200 EACH | Refills: 3 | Status: SHIPPED | OUTPATIENT
Start: 2025-01-22

## 2025-01-22 RX ORDER — GLIMEPIRIDE 4 MG/1
TABLET ORAL
Qty: 60 TABLET | Refills: 11 | Status: SHIPPED | OUTPATIENT
Start: 2025-01-22

## 2025-01-22 NOTE — ASSESSMENT & PLAN NOTE
Stage II hypertension based on home readings provided today. Patient reports adherence to medication therapy as prescribed. Reviewed proper monitoring technique during our visit. He denies coffee, caffeine or stimulant use. Advised to increase home monitoring and provide a log of blood pressure readings for follow up with Dr. Lorenzo. He is prescribed lisinopril 40 mg, eplerenone 25 mg daily and metoprolol succinate er 100 mg in the setting of hypertension. Additional consideration could include thiazide, DHP CCB.

## 2025-01-22 NOTE — ASSESSMENT & PLAN NOTE
Patients diabetes is uncontrolled and above goal with most recent A1c of 8.1% on 11/11/2024 improved from 8.4% on 11/7/2023 (Goal < 7%). Patient exhibits proteinuria despite prescribed therapy with maximally dosed Lisinopril and Farxiga 10 mg. He reports adherence to currently prescribed therapy for TDM2 including Trulicity 0.75 mg, Farxiga 10 mg and glimepiride 4 mg BID. He is not monitoring his glucose at home. He denies N/V/D. Denies constipation. His medication list was reconciled during the encounter. Today I advised a dose increase in his Trulicity to the 1.5 mg dose. He will continue with his antihyperglycemic agents as prescribed otherwise. Testing supplies will be sent in.

## 2025-01-23 ENCOUNTER — PHARMACY VISIT (OUTPATIENT)
Dept: PHARMACY | Facility: CLINIC | Age: 60
End: 2025-01-23

## 2025-01-24 ENCOUNTER — CLINICAL SUPPORT (OUTPATIENT)
Dept: NUTRITION | Facility: HOSPITAL | Age: 60
End: 2025-01-24
Payer: COMMERCIAL

## 2025-01-24 NOTE — PROGRESS NOTES
[employer]  Diet Recommendation 1: Heart Healthy  Diet Recommendation 2: Diabetes  Diet Recommendation 3: Healthy Eating  Food Intolerance Avoidance: NKFA  Nutrition Goals Stated: Wt loss  Household Size: 1 Family Member  Interventions: Referral Number: 2nd 6 Mo Referral 1 yr (Referrals may not be consecutive)  Interventions: Visit Number: 1 of 6 Visits - Max 6 Visits/Referral Each 6 Mo Period  Education Today: [employer] Meals  Grains: 0-25% Whole  Fruit: Fresh - 100%  Vegetables: Fresh - 100%  Proteins: 1-2 Plant-based Items  Dairy: 0-25% Lowfat  Originating Site of Referral Order: [facility]  Initials of RD Assisting Today: [facility]

## 2025-01-27 ENCOUNTER — HOSPITAL ENCOUNTER (OUTPATIENT)
Dept: RADIOLOGY | Facility: CLINIC | Age: 60
Discharge: HOME | End: 2025-01-27
Payer: COMMERCIAL

## 2025-01-27 DIAGNOSIS — I10 ESSENTIAL (PRIMARY) HYPERTENSION: ICD-10-CM

## 2025-01-27 PROCEDURE — 76770 US EXAM ABDO BACK WALL COMP: CPT | Performed by: RADIOLOGY

## 2025-01-27 PROCEDURE — 76770 US EXAM ABDO BACK WALL COMP: CPT

## 2025-01-30 DIAGNOSIS — E11.9 DIABETES MELLITUS TYPE 2 WITHOUT RETINOPATHY (MULTI): ICD-10-CM

## 2025-01-30 DIAGNOSIS — I25.118 CORONARY ARTERY DISEASE OF NATIVE ARTERY OF NATIVE HEART WITH STABLE ANGINA PECTORIS: Primary | ICD-10-CM

## 2025-01-30 DIAGNOSIS — E78.2 HYPERLIPEMIA, MIXED: ICD-10-CM

## 2025-02-13 ENCOUNTER — APPOINTMENT (OUTPATIENT)
Dept: PHARMACY | Facility: HOSPITAL | Age: 60
End: 2025-02-13
Payer: COMMERCIAL

## 2025-02-13 DIAGNOSIS — E11.9 DIABETES MELLITUS TYPE 2 WITHOUT RETINOPATHY (MULTI): ICD-10-CM

## 2025-02-13 DIAGNOSIS — E11.49 TYPE 2 DIABETES MELLITUS WITH OTHER DIABETIC NEUROLOGICAL COMPLICATION: ICD-10-CM

## 2025-02-13 DIAGNOSIS — I10 ESSENTIAL (PRIMARY) HYPERTENSION: ICD-10-CM

## 2025-02-13 NOTE — PROGRESS NOTES
Patient is sent at the request of Isaias Lorenzo MD for my opinion regarding Type 2 diabetes.  My final recommendations will be communicated back to the requesting provider by way of shared medical record.    Subjective     Maurice Sterling is a 59 y.o. year old male patient with type two diabetes mellitus, CKD G2/A3, coronary artery disease, class I obesity (BMI 31.46 kg/m2), polysubstance abuse, bipolar disorder and hx of papillary thyroid carcinoma referred for medication management. He completed a follow up visit with Dr. Lorenzo on 11/19/2024, next visit April 2025. He completed repeat laboratory studies on 11/11/2024 with an elevated A1c at 8.1% improved from previous panel. During our previous visit I reconciled his medication list and provided refills for his current prescription therapy. In addition, a dose increase in his Trulicity was made from 0.75 mg to 1.5 mg once weekly. Glucose monitoring supplies was sent to his preferred pharmacy to begin home monitoring.     Diabetes  He presents for his initial diabetic visit. He has type 2 diabetes mellitus. His disease course has been improving. There are no hypoglycemic associated symptoms. Diabetic complications include heart disease and nephropathy. There is no compliance with monitoring of blood glucose. An ACE inhibitor/angiotensin II receptor blocker is being taken.     Past Medical History:  He has a past medical history of Attention-deficit hyperactivity disorder, predominantly inattentive type (02/25/2022), Body mass index (BMI) 25.0-25.9, adult, Body mass index (BMI) 26.0-26.9, adult, Body mass index (BMI) 29.0-29.9, adult (05/07/2021), Body mass index (BMI) 31.0-31.9, adult (09/24/2021), Body mass index (BMI) 31.0-31.9, adult (10/22/2021), Body mass index (BMI) 32.0-32.9, adult (11/23/2021), Body mass index (BMI) 34.0-34.9, adult (05/21/2021), Body mass index (BMI) 34.0-34.9, adult, Body mass index (BMI)30.0-30.9, adult (07/28/2022), Body mass index  (BMI)30.0-30.9, adult (02/25/2022), Calculus of kidney (08/23/2018), Cervicalgia (08/14/2018), Cervicalgia (08/14/2018), Chills (without fever) (08/14/2018), Dorsalgia, unspecified (11/16/2015), Encounter for issue of repeat prescription (05/07/2021), Encounter for other preprocedural examination (02/15/2022), Lower abdominal pain, unspecified (09/24/2021), Obesity, unspecified (06/07/2022), Otalgia, right ear (07/28/2022), Other chest pain (05/13/2022), Other conditions influencing health status (10/31/2019), Other conditions influencing health status, Other intervertebral disc degeneration, lumbosacral region (11/16/2015), Pain in left shoulder (10/04/2022), Personal history of (healed) traumatic fracture (11/09/2022), Personal history of (healed) traumatic fracture (11/09/2022), Personal history of (healed) traumatic fracture (11/09/2022), Personal history of (healed) traumatic fracture (11/09/2022), Personal history of Methicillin resistant Staphylococcus aureus infection (12/29/2016), Personal history of other (healed) physical injury and trauma (11/09/2022), Personal history of other diseases of the digestive system (10/14/2021), Personal history of other diseases of the musculoskeletal system and connective tissue (06/12/2018), Personal history of other diseases of the musculoskeletal system and connective tissue (11/09/2022), Personal history of other diseases of the nervous system and sense organs (07/28/2022), Personal history of other diseases of the nervous system and sense organs (08/01/2022), Personal history of other endocrine, nutritional and metabolic disease (09/26/2022), Personal history of other endocrine, nutritional and metabolic disease (01/09/2020), Personal history of other endocrine, nutritional and metabolic disease (08/14/2018), Personal history of other endocrine, nutritional and metabolic disease, Personal history of other infectious and parasitic diseases (09/20/2019), Personal history  of other specified conditions (06/07/2022), Poisoning by 4-aminophenol derivatives, accidental (unintentional), initial encounter (06/12/2018), Radiculopathy, cervical region (08/06/2018), Radiculopathy, cervical region (08/14/2018), Sepsis, unspecified organism (Multi), Synovial cyst of popliteal space ([place]), right knee (11/30/2016), Unspecified disorder of eyelid (08/22/2018), Unspecified fracture of shaft of unspecified tibia, initial encounter for closed fracture (05/21/2021), Unspecified fracture of unspecified calcaneus, initial encounter for closed fracture (11/09/2022), Unspecified injury of head, subsequent encounter (01/09/2020), and Unspecified injury of neck, initial encounter (06/12/2018).    Past Surgical History:  He has a past surgical history that includes Thyroid surgery (11/16/2015); Other surgical history (11/09/2022); Hernia repair (05/21/2021); Other surgical history (09/20/2019); Other surgical history (09/20/2019); Other surgical history (05/19/2022); CT angio coronary art with heartflow if score >30% (3/21/2022); CT guided percutaneous peritoneal or retroperitoneal fluid collection drainage (8/22/2019); and CT guided chest tube placement (8/21/2019).    Social History:  He reports that he quit smoking about 28 years ago. His smoking use included cigarettes. He has never used smokeless tobacco. He reports current alcohol use of about 9.0 standard drinks of alcohol per week. He reports current drug use. Drug: Marijuana.    Family History:  Family History   Problem Relation Name Age of Onset    Diabetes Father      Other (CARDIAC DISORDER) Father      Arthritis Other FAMILY HISTORY     Psoriasis Other FAMILY HISTORY      Allergies:  Prednisone    Interval Hx:   1/27/2025 - Patient reported discovering his girlfriend in his home completed a successful suicide attempt. Since this period he has not been staying in his home. He is currently staying with a friend. He does not have a health care  "practitioner that he is established with to discuss this situation. He is not established with primary care at [facility]. He endorsed a willingness to meet or discuss with behavioral health at [facility]. He does not have a current mode of transportation for himself. His car is currently in the [employer] shop. His friend Shay has helped for transportation purposes.     Thoughts of suicide: He denied suicidal thoughts, but patient did not want to discuss at length  Thoughts self-harm: He denies self-harm, patient did not want to discuss at length  If intended to commit self-harm, is there any plan or intent to do so: N/A  Family/social support: Friend (Shay); No other family or social support   Crisis hot line phone number provided to patient: Yes   Instructed patient to proceed to the closest emergency department or contact emergency services if he has suicidal thoughts or feels unsafe.   Care management was contacted during the visit to provide patient social work services.     DIABETES MELLITUS TYPE 2:    Does patient follow with Endocrinology: No  Does patient follow with Nephrology: No  Does pt have proteinuria? Yes    Lifestyle:  Not discussed    Tobacco Use: None  Alcohol: Coffee with rum (1-2 shots) or 2 tall cans of beer \"couple times per week\" ; No overall reported change since 1/27  Illicit Drug Use: Smoking Marijuana - \"couple times per week\" ; No overall reported change since 1/27    Current diabetic medications include:  Glimepiride 4 mg: Take one tablet by mouth twice daily.  Trulicity 3 mg: Inject 3 mg under the skin once weekly on Fridays (Doses remaining: 3; First injection completed on 2/7/2025; Last injection completed on 01/17/2025 with Trulicity 1.5 mg)  Farxiga 10 mg: Take one tablet by mouth once daily in the morning    Historical DM pharmacotherapy:  Metformin use - Unknown per patient  Xigduo Xr 10/1000 mg - Therapy previously prescribed    Current hypertension " medications include:  Metoprolol succinate 100 mg: Take one tablet by mouth once daily  Lisinopril 40 mg: Take one tablet by mouth once daily   Eplerenone 25 mg: Take one tablet by mouth once daily    Current hyperlipidemia medications include:  Ezetimibe 10 mg: Take one tablet by mouth once daily (Not taking)  Rosuvastatin 20 mg: Take one tablet by mouth once daily     Adverse Effects:   Nausea without vomiting or diarrhea. Denies constipation.     Adherence/Organization:  - AEMR Fill History Reviewed: yes  - How often on an average per week does the patient forget to take a dose of their medications? He reports one day in the last two weeks missing any doses of therapy.   - Do you utilize auto-fill services? No  - Do you use a pillbox at home?  yes - Once weekly  - Is it weekly or monthly? Weekly   - Do you prepare your own pillbox? Yes    Glucose Readings:  Glucometer/CGM Type: None  CGM monitoring supplies - None    Glucose monitoring supplies received - Has not begun monitoring at home     Current home BG readings: None    Previous home BG readings: N/A     Any episodes of hypoglycemia? N/A.      Blood pressure Readings:  Monitor: Upper Arm Cuff  Monitoring Frequency: 1-2 times per day x 7 days  Timing of antihypertensive administration: 10 am     Blood pressure unavailable for interpretation - Logbook of readings are available at his home.     Secondary Prevention:  Statin? Yes  ACE-I/ARB? Yes  Aspirin? Yes    Pertinent PMH Review  PMH of Pancreatitis: No  PMH of Retinopathy: No  PMH of Recurrent Urinary Tract Infections: No   PMH of MTC/MEN type II: No    Drug Interactions:  Eplerenone + Lisinopril: Monitor potassium, enhanced hyperkalemic effect when agents are combined.    Objective     Last Recorded Vitals:  BP Readings from Last 6 Encounters:   11/13/24 (!) 152/102   06/28/24 (!) 178/115   11/08/23 (!) 147/103   11/03/23 (!) 158/100   10/10/23 (!) 151/101   07/07/23 130/85      Wt Readings from Last 6  Encounters:   11/13/24 105 kg (232 lb)   06/27/24 104 kg (230 lb)   11/08/23 107 kg (236 lb 12.8 oz)   11/03/23 108 kg (238 lb)   10/10/23 110 kg (242 lb 8.1 oz)   07/07/23 109 kg (240 lb 6 oz)     BMI Readings from Last 6 Encounters:   11/13/24 31.46 kg/m²   06/27/24 31.19 kg/m²   11/08/23 32.12 kg/m²   11/03/23 32.28 kg/m²   10/10/23 32.89 kg/m²   07/07/23 29.26 kg/m²     Lab Review  Lab Results   Component Value Date    BILITOT 0.5 11/11/2024    CALCIUM 9.6 01/07/2025    CO2 25 01/07/2025     01/07/2025    CREATININE 1.00 01/07/2025    GLUCOSE 135 (H) 01/07/2025    ALKPHOS 59 11/11/2024    K 4.1 01/07/2025    PROT 7.4 01/07/2025     01/07/2025    AST 31 11/11/2024    ALT 40 11/11/2024    BUN 22 01/07/2025    ANIONGAP 12 01/07/2025    MG 1.75 11/23/2021    PHOS 3.9 01/07/2025     08/20/2019    ALBUMIN 4.8 01/07/2025    AMYLASE 70 12/31/2020    LIPASE 18 04/05/2021    GFRMALE 70 05/09/2023     Lab Results   Component Value Date    TRIG 284 (H) 11/11/2024    CHOL 156 11/11/2024    LDLCALC 51 11/11/2024    HDL 48.2 11/11/2024     Lab Results   Component Value Date    HGBA1C 8.1 (H) 11/11/2024    HGBA1C 8.4 (H) 11/07/2023    HGBA1C 9.0 (A) 11/03/2023     Component      Latest Ref Rng 5/9/2023 11/7/2023 11/11/2024   ALBUMIN (MG/L) IN URINE      Not Established mg/L 187.7      Albumin/Creatinine Ratio      <30.0 ug/mg Creat 460.0 (H)  964.0 (H)  768.9 (H)    Creatinine, Urine Random      20.0 - 370.0 mg/dL 40.8  74.2  111.0    Albumin, Urine Random      Not established mg/L  715.3  853.5       Legend:  (H) High    The ASCVD Risk score (Jonathan DENTON, et al., 2019) failed to calculate for the following reasons:    Risk score cannot be calculated because patient has a medical history suggesting prior/existing ASCVD    Assessment/Plan   Problem List Items Addressed This Visit    None    Diabetes/hypertension management not discussed during today's visit. See below.    1/27/2025 - Patient reported discovering  his girlfriend in his home completed a successful suicide attempt. Since this period he has not been staying in his home. He is currently staying with a friend (Shay). He does not have a health care practitioner that he is established with to discuss this situation. He is not established with primary care at [facility]. He endorsed a willingness to meet or discuss with behavioral health at [facility]. He does not have a current mode of transportation for himself. His car is currently in the [employer] shop. His friend Shay has helped for transportation purposes.     Thoughts of suicide: He denied suicidal thoughts, but patient did not want to discuss at length  Thoughts self-harm: He denies self-harm, patient did not want to discuss at length  If intended to commit self-harm, is there any plan or intent to do so: N/A  Family/social support: Friend (Shay); No other family or social support   Crisis hot line phone number provided to patient: Yes   Instructed patient to proceed to the closest emergency department or contact emergency services if he has suicidal thoughts or feels unsafe.   Care management was contacted during the visit to provide patient social work services.     Follow-up: I recommend diabetes care be 02/20/2025 at 1:20 pm .  Cardiology Follow-Up: 4/7/2025    Continue all meds under the continuation of care with the referring provider and clinical pharmacy team.

## 2025-02-14 ENCOUNTER — PATIENT OUTREACH (OUTPATIENT)
Dept: CARE COORDINATION | Facility: CLINIC | Age: 60
End: 2025-02-14
Payer: COMMERCIAL

## 2025-02-14 ENCOUNTER — DOCUMENTATION (OUTPATIENT)
Dept: CARE COORDINATION | Facility: CLINIC | Age: 60
End: 2025-02-14
Payer: COMMERCIAL

## 2025-02-14 NOTE — PROGRESS NOTES
Outreach to patient due to care management referral.   Scheduled follow up to assist and identify supports.   SHIRA Rivera  O    Contact: [phone]

## 2025-02-18 ENCOUNTER — PATIENT OUTREACH (OUTPATIENT)
Dept: CARE COORDINATION | Facility: CLINIC | Age: 60
End: 2025-02-18
Payer: COMMERCIAL

## 2025-02-18 NOTE — PROGRESS NOTES
Outreach to patient for follow up.   Dicussed some coping strategies he has been using.   Discussed being avoidant of home.   Not yet ready for support group.   Discussed being open to a therapist to assist with grief.   SHIRA Rivera  [facility]    Contact: [phone]

## 2025-02-19 ENCOUNTER — APPOINTMENT (OUTPATIENT)
Dept: PHARMACY | Facility: HOSPITAL | Age: 60
End: 2025-02-19
Payer: COMMERCIAL

## 2025-02-19 DIAGNOSIS — E11.9 DIABETES MELLITUS TYPE 2 WITHOUT RETINOPATHY (MULTI): ICD-10-CM

## 2025-02-19 DIAGNOSIS — E11.49 TYPE 2 DIABETES MELLITUS WITH OTHER DIABETIC NEUROLOGICAL COMPLICATION: ICD-10-CM

## 2025-02-19 DIAGNOSIS — I10 ESSENTIAL (PRIMARY) HYPERTENSION: ICD-10-CM

## 2025-02-19 NOTE — PROGRESS NOTES
Patient is sent at the request of Isaias Lorenzo MD for my opinion regarding Type 2 diabetes.  My final recommendations will be communicated back to the requesting provider by way of shared medical record.    Subjective     Maurice Sterling is a 59 y.o. year old male patient with type two diabetes mellitus, CKD G2/A3, coronary artery disease, class I obesity (BMI 31.46 kg/m2), polysubstance abuse, bipolar disorder and hx of papillary thyroid carcinoma referred for medication management. He completed a follow up visit with Dr. Lorenzo on 11/19/2024, next visit April 2025. He completed repeat laboratory studies on 11/11/2024 with an elevated A1c at 8.1% improved from previous panel. During our previous visit I reconciled his medication list and provided refills for his current prescription therapy. In addition, a dose increase in his Trulicity was made from 0.75 mg to 1.5 mg once weekly. Glucose monitoring supplies was sent to his preferred pharmacy to begin home monitoring.     Diabetes  He presents for his initial diabetic visit. He has type 2 diabetes mellitus. His disease course has been improving. There are no hypoglycemic associated symptoms. Diabetic complications include heart disease and nephropathy. There is no compliance with monitoring of blood glucose. An ACE inhibitor/angiotensin II receptor blocker is being taken.     Past Medical History:  He has a past medical history of Attention-deficit hyperactivity disorder, predominantly inattentive type (02/25/2022), Body mass index (BMI) 25.0-25.9, adult, Body mass index (BMI) 26.0-26.9, adult, Body mass index (BMI) 29.0-29.9, adult (05/07/2021), Body mass index (BMI) 31.0-31.9, adult (09/24/2021), Body mass index (BMI) 31.0-31.9, adult (10/22/2021), Body mass index (BMI) 32.0-32.9, adult (11/23/2021), Body mass index (BMI) 34.0-34.9, adult (05/21/2021), Body mass index (BMI) 34.0-34.9, adult, Body mass index (BMI)30.0-30.9, adult (07/28/2022), Body mass index  (BMI)30.0-30.9, adult (02/25/2022), Calculus of kidney (08/23/2018), Cervicalgia (08/14/2018), Cervicalgia (08/14/2018), Chills (without fever) (08/14/2018), Dorsalgia, unspecified (11/16/2015), Encounter for issue of repeat prescription (05/07/2021), Encounter for other preprocedural examination (02/15/2022), Lower abdominal pain, unspecified (09/24/2021), Obesity, unspecified (06/07/2022), Otalgia, right ear (07/28/2022), Other chest pain (05/13/2022), Other conditions influencing health status (10/31/2019), Other conditions influencing health status, Other intervertebral disc degeneration, lumbosacral region (11/16/2015), Pain in left shoulder (10/04/2022), Personal history of (healed) traumatic fracture (11/09/2022), Personal history of (healed) traumatic fracture (11/09/2022), Personal history of (healed) traumatic fracture (11/09/2022), Personal history of (healed) traumatic fracture (11/09/2022), Personal history of Methicillin resistant Staphylococcus aureus infection (12/29/2016), Personal history of other (healed) physical injury and trauma (11/09/2022), Personal history of other diseases of the digestive system (10/14/2021), Personal history of other diseases of the musculoskeletal system and connective tissue (06/12/2018), Personal history of other diseases of the musculoskeletal system and connective tissue (11/09/2022), Personal history of other diseases of the nervous system and sense organs (07/28/2022), Personal history of other diseases of the nervous system and sense organs (08/01/2022), Personal history of other endocrine, nutritional and metabolic disease (09/26/2022), Personal history of other endocrine, nutritional and metabolic disease (01/09/2020), Personal history of other endocrine, nutritional and metabolic disease (08/14/2018), Personal history of other endocrine, nutritional and metabolic disease, Personal history of other infectious and parasitic diseases (09/20/2019), Personal history  of other specified conditions (06/07/2022), Poisoning by 4-aminophenol derivatives, accidental (unintentional), initial encounter (06/12/2018), Radiculopathy, cervical region (08/06/2018), Radiculopathy, cervical region (08/14/2018), Sepsis, unspecified organism (Multi), Synovial cyst of popliteal space ([place]), right knee (11/30/2016), Unspecified disorder of eyelid (08/22/2018), Unspecified fracture of shaft of unspecified tibia, initial encounter for closed fracture (05/21/2021), Unspecified fracture of unspecified calcaneus, initial encounter for closed fracture (11/09/2022), Unspecified injury of head, subsequent encounter (01/09/2020), and Unspecified injury of neck, initial encounter (06/12/2018).    Past Surgical History:  He has a past surgical history that includes Thyroid surgery (11/16/2015); Other surgical history (11/09/2022); Hernia repair (05/21/2021); Other surgical history (09/20/2019); Other surgical history (09/20/2019); Other surgical history (05/19/2022); CT angio coronary art with heartflow if score >30% (3/21/2022); CT guided percutaneous peritoneal or retroperitoneal fluid collection drainage (8/22/2019); and CT guided chest tube placement (8/21/2019).    Social History:  He reports that he quit smoking about 28 years ago. His smoking use included cigarettes. He has never used smokeless tobacco. He reports current alcohol use of about 9.0 standard drinks of alcohol per week. He reports current drug use. Drug: Marijuana.    Family History:  Family History   Problem Relation Name Age of Onset    Diabetes Father      Other (CARDIAC DISORDER) Father      Arthritis Other FAMILY HISTORY     Psoriasis Other FAMILY HISTORY      Allergies:  Prednisone    Interval Hx:   1/27/2025 - Patient reported discovering his girlfriend in his home completed a successful suicide attempt. Since this period he has not been staying in his home. He is currently staying with a friend. He does not have a health care  "practitioner that he is established with to discuss this situation. He is not established with primary care at [facility]. He endorsed a willingness to meet or discuss with behavioral health at [facility]. He does not have a current mode of transportation for himself. His car is currently in the [employer]. His friend Shay has helped for transportation purposes.     Thoughts of suicide: He denied suicidal thoughts, but patient did not want to discuss at length  Thoughts self-harm: He denies self-harm, patient did not want to discuss at length  If intended to commit self-harm, is there any plan or intent to do so: N/A  Family/social support: Friend (Shay); No other family or social support   Crisis hot line phone number provided to patient: Yes   Instructed patient to proceed to the closest emergency department or contact emergency services if he has suicidal thoughts or feels unsafe.   Care management was contacted during the visit to provide patient social work services.     DIABETES MELLITUS TYPE 2:    Does patient follow with Endocrinology: No  Does patient follow with Nephrology: No  Does pt have proteinuria? Yes    Lifestyle:   He is still staying with a friend - Shay   He generally eating breakfast with a light lunch and light dinner  His breakfast today included eggs, camarena and toast. This is a typical breakfast for him.   Fluids: Water, orange juice.     Tobacco Use: None  Alcohol: Coffee with rum (1-2 shots) or 2 tall cans of beer \"couple times per week\" ; No overall reported change since 1/27  Illicit Drug Use: Smoking Marijuana - \"couple times per week\" ; No overall reported change since 1/27    Current diabetic medications include:  Glimepiride 4 mg: Take one tablet by mouth twice daily.  Trulicity 3 mg: Inject 3 mg under the skin once weekly on Fridays (Doses remaining: 3; First injection completed on 2/7/2025; Last injection 2/7/2025)  Farxiga 10 mg: Take one tablet by mouth once " daily in the morning    Historical DM pharmacotherapy:  Metformin use - Unknown per patient  Xigduo Xr 10/1000 mg - Therapy previously prescribed    Current hypertension medications include:  Metoprolol succinate 100 mg: Take one tablet by mouth once daily  Lisinopril 40 mg: Take one tablet by mouth once daily   Eplerenone 25 mg: Take one tablet by mouth once daily    Current hyperlipidemia medications include:  Ezetimibe 10 mg: Take one tablet by mouth once daily (Not taking)  Rosuvastatin 20 mg: Take one tablet by mouth once daily     Adverse Effects:   Nausea without vomiting or diarrhea. Denies constipation.     Adherence/Organization:  - AEMR Fill History Reviewed: yes  - How often on an average per week does the patient forget to take a dose of their medications? ~0  - Do you utilize auto-fill services? No  - Do you use a pillbox at home?  yes - Once weekly  - Is it weekly or monthly? Weekly   - Do you prepare your own pillbox? Yes    Glucose Readings:  Glucometer/CGM Type: None  CGM monitoring supplies - None    Glucose monitoring supplies received - Has not begun monitoring at home     Current home BG readings: None    Previous home BG readings: N/A     Any episodes of hypoglycemia? N/A.      Blood pressure Readings:  Monitor: Upper Arm Cuff  Monitoring Frequency: 1-2 times per day x 7 days  Timing of antihypertensive administration: 10 am     Blood pressure unavailable for interpretation - Logbook of readings are available at his home.     Secondary Prevention:  Statin? Yes  ACE-I/ARB? Yes  Aspirin? Yes    Pertinent PMH Review  PMH of Pancreatitis: No  PMH of Retinopathy: No  PMH of Recurrent Urinary Tract Infections: No   PMH of MTC/MEN type II: No    Drug Interactions:  Eplerenone + Lisinopril: Monitor potassium, enhanced hyperkalemic effect when agents are combined.    Objective     Last Recorded Vitals:  BP Readings from Last 6 Encounters:   11/13/24 (!) 152/102   06/28/24 (!) 178/115   11/08/23 (!)  147/103   11/03/23 (!) 158/100   10/10/23 (!) 151/101   07/07/23 130/85      Wt Readings from Last 6 Encounters:   11/13/24 105 kg (232 lb)   06/27/24 104 kg (230 lb)   11/08/23 107 kg (236 lb 12.8 oz)   11/03/23 108 kg (238 lb)   10/10/23 110 kg (242 lb 8.1 oz)   07/07/23 109 kg (240 lb 6 oz)     BMI Readings from Last 6 Encounters:   11/13/24 31.46 kg/m²   06/27/24 31.19 kg/m²   11/08/23 32.12 kg/m²   11/03/23 32.28 kg/m²   10/10/23 32.89 kg/m²   07/07/23 29.26 kg/m²     Lab Review  Lab Results   Component Value Date    BILITOT 0.5 11/11/2024    CALCIUM 9.6 01/07/2025    CO2 25 01/07/2025     01/07/2025    CREATININE 1.00 01/07/2025    GLUCOSE 135 (H) 01/07/2025    ALKPHOS 59 11/11/2024    K 4.1 01/07/2025    PROT 7.4 01/07/2025     01/07/2025    AST 31 11/11/2024    ALT 40 11/11/2024    BUN 22 01/07/2025    ANIONGAP 12 01/07/2025    MG 1.75 11/23/2021    PHOS 3.9 01/07/2025     08/20/2019    ALBUMIN 4.8 01/07/2025    AMYLASE 70 12/31/2020    LIPASE 18 04/05/2021    GFRMALE 70 05/09/2023     Lab Results   Component Value Date    TRIG 284 (H) 11/11/2024    CHOL 156 11/11/2024    LDLCALC 51 11/11/2024    HDL 48.2 11/11/2024     Lab Results   Component Value Date    HGBA1C 8.1 (H) 11/11/2024    HGBA1C 8.4 (H) 11/07/2023    HGBA1C 9.0 (A) 11/03/2023     Component      Latest Ref Rng 5/9/2023 11/7/2023 11/11/2024   ALBUMIN (MG/L) IN URINE      Not Established mg/L 187.7      Albumin/Creatinine Ratio      <30.0 ug/mg Creat 460.0 (H)  964.0 (H)  768.9 (H)    Creatinine, Urine Random      20.0 - 370.0 mg/dL 40.8  74.2  111.0    Albumin, Urine Random      Not established mg/L  715.3  853.5       Legend:  (H) High    The ASCVD Risk score (Jonathan DENTON, et al., 2019) failed to calculate for the following reasons:    Risk score cannot be calculated because patient has a medical history suggesting prior/existing ASCVD    Assessment/Plan   Problem List Items Addressed This Visit       Diabetes mellitus type 2  without retinopathy (Multi)     Patients diabetes is uncontrolled and above goal with most recent A1c of 8.1% on 11/11/2024 improved from 8.4% on 11/7/2023 (Goal < 7%). Patient exhibits proteinuria despite prescribed therapy with maximally dosed Lisinopril and Farxiga 10 mg. No changes today. Glucose readings unavailable. Blood pressure readings unavailable. Injectable therapy not resumed. Oral therapy adherent.          Relevant Orders    Referral to Clinical Pharmacy     Other Visit Diagnoses       Essential (primary) hypertension        Relevant Orders    Referral to Clinical Pharmacy    Type 2 diabetes mellitus with other diabetic neurological complication        Relevant Orders    Referral to Clinical Pharmacy          Follow-up: I recommend diabetes care be 03/07/2025 at 2 pm.  Cardiology Follow-Up: 4/7/2025    Continue all meds under the continuation of care with the referring provider and clinical pharmacy team.

## 2025-02-27 DIAGNOSIS — I25.10 ATHEROSCLEROTIC HEART DISEASE OF NATIVE CORONARY ARTERY WITHOUT ANGINA PECTORIS: ICD-10-CM

## 2025-02-28 ENCOUNTER — APPOINTMENT (OUTPATIENT)
Dept: NUTRITION | Facility: HOSPITAL | Age: 60
End: 2025-02-28
Payer: COMMERCIAL

## 2025-02-28 RX ORDER — CLOPIDOGREL BISULFATE 75 MG/1
75 TABLET ORAL DAILY
Qty: 90 TABLET | Refills: 3 | Status: SHIPPED | OUTPATIENT
Start: 2025-02-28

## 2025-03-03 ENCOUNTER — CLINICAL SUPPORT (OUTPATIENT)
Facility: HOSPITAL | Age: 60
End: 2025-03-03
Payer: COMMERCIAL

## 2025-03-03 DIAGNOSIS — E78.5 DYSLIPIDEMIA: ICD-10-CM

## 2025-03-03 DIAGNOSIS — E11.9 DIABETES MELLITUS TYPE 2 WITHOUT RETINOPATHY (MULTI): ICD-10-CM

## 2025-03-03 DIAGNOSIS — E11.10 DM (DIABETES MELLITUS) TYPE 2, UNCONTROLLED, WITH KETOACIDOSIS: ICD-10-CM

## 2025-03-03 NOTE — PROGRESS NOTES
[employer]  Diet Recommendation 1: Heart Healthy  Diet Recommendation 2: Diabetes  Food Intolerance Avoidance: NKFA  Household Size: 1 Family Member  Interventions: Referral Number: 2nd 6 Mo Referral 1 yr (Referrals may not be consecutive)  Interventions: Visit Number: 2 of 6 Visits - Max 6 Visits/Referral Each 6 Mo Period  Grains: 25-50% Whole  Fruit: 25-50% Fresh  Vegetables: 50-75% Fresh  Proteins: 1-2 Plant-based Items  Dairy: 25-50% Lowfat  Originating Site of Referral Order: [id number]  Initials of RD Assisting Today: [facility]

## 2025-03-07 ENCOUNTER — APPOINTMENT (OUTPATIENT)
Dept: PHARMACY | Facility: HOSPITAL | Age: 60
End: 2025-03-07
Payer: COMMERCIAL

## 2025-03-07 DIAGNOSIS — E11.49 TYPE 2 DIABETES MELLITUS WITH OTHER DIABETIC NEUROLOGICAL COMPLICATION: ICD-10-CM

## 2025-03-07 DIAGNOSIS — I10 ESSENTIAL (PRIMARY) HYPERTENSION: ICD-10-CM

## 2025-03-07 DIAGNOSIS — E11.10 DM (DIABETES MELLITUS) TYPE 2, UNCONTROLLED, WITH KETOACIDOSIS: ICD-10-CM

## 2025-03-07 DIAGNOSIS — I10 BENIGN ESSENTIAL HYPERTENSION: ICD-10-CM

## 2025-03-07 DIAGNOSIS — E11.9 DIABETES MELLITUS TYPE 2 WITHOUT RETINOPATHY (MULTI): Primary | ICD-10-CM

## 2025-03-07 PROCEDURE — RXMED WILLOW AMBULATORY MEDICATION CHARGE

## 2025-03-07 NOTE — PROGRESS NOTES
Patient is sent at the request of Isaias Lorenzo MD for my opinion regarding Type 2 diabetes.  My final recommendations will be communicated back to the requesting provider by way of shared medical record.    Subjective     Maurice Sterling is a 59 y.o. year old male patient with type two diabetes mellitus, CKD G2/A3, coronary artery disease, class I obesity (BMI 31.46 kg/m2), polysubstance abuse, bipolar disorder and hx of papillary thyroid carcinoma referred for medication management. He completed a follow up visit with Dr. Lorenzo on 11/19/2024, next visit April 2025. He completed repeat laboratory studies on 11/11/2024 with an elevated A1c at 8.1% improved from previous panel. He is currently prescribed glimepiride 4 mg twice daily, Trulicity 1.5 mg once weekly, and Farxiga 10 mg once daily in the setting of type two diabetes. He is prescribed lisinopril 40 mg once daily, Metoprolol succinate 100 mg once daily, eplerenone 25 mg daily and hydralazine 25 mg once daily in the setting of hypertension. Today we are reviewing his adherence to medication therapy, glucose readings and blood pressure values.     Diabetes  He presents for his initial diabetic visit. He has type 2 diabetes mellitus. His disease course has been improving. There are no hypoglycemic associated symptoms. Diabetic complications include heart disease and nephropathy. There is no compliance with monitoring of blood glucose. An ACE inhibitor/angiotensin II receptor blocker is being taken.     Past Medical History:  He has a past medical history of Attention-deficit hyperactivity disorder, predominantly inattentive type (02/25/2022), Body mass index (BMI) 25.0-25.9, adult, Body mass index (BMI) 26.0-26.9, adult, Body mass index (BMI) 29.0-29.9, adult (05/07/2021), Body mass index (BMI) 31.0-31.9, adult (09/24/2021), Body mass index (BMI) 31.0-31.9, adult (10/22/2021), Body mass index (BMI) 32.0-32.9, adult (11/23/2021), Body mass index (BMI)  34.0-34.9, adult (05/21/2021), Body mass index (BMI) 34.0-34.9, adult, Body mass index (BMI)30.0-30.9, adult (07/28/2022), Body mass index (BMI)30.0-30.9, adult (02/25/2022), Calculus of kidney (08/23/2018), Cervicalgia (08/14/2018), Cervicalgia (08/14/2018), Chills (without fever) (08/14/2018), Dorsalgia, unspecified (11/16/2015), Encounter for issue of repeat prescription (05/07/2021), Encounter for other preprocedural examination (02/15/2022), Lower abdominal pain, unspecified (09/24/2021), Obesity, unspecified (06/07/2022), Otalgia, right ear (07/28/2022), Other chest pain (05/13/2022), Other conditions influencing health status (10/31/2019), Other conditions influencing health status, Other intervertebral disc degeneration, lumbosacral region (11/16/2015), Pain in left shoulder (10/04/2022), Personal history of (healed) traumatic fracture (11/09/2022), Personal history of (healed) traumatic fracture (11/09/2022), Personal history of (healed) traumatic fracture (11/09/2022), Personal history of (healed) traumatic fracture (11/09/2022), Personal history of Methicillin resistant Staphylococcus aureus infection (12/29/2016), Personal history of other (healed) physical injury and trauma (11/09/2022), Personal history of other diseases of the digestive system (10/14/2021), Personal history of other diseases of the musculoskeletal system and connective tissue (06/12/2018), Personal history of other diseases of the musculoskeletal system and connective tissue (11/09/2022), Personal history of other diseases of the nervous system and sense organs (07/28/2022), Personal history of other diseases of the nervous system and sense organs (08/01/2022), Personal history of other endocrine, nutritional and metabolic disease (09/26/2022), Personal history of other endocrine, nutritional and metabolic disease (01/09/2020), Personal history of other endocrine, nutritional and metabolic disease (08/14/2018), Personal history of  other endocrine, nutritional and metabolic disease, Personal history of other infectious and parasitic diseases (09/20/2019), Personal history of other specified conditions (06/07/2022), Poisoning by 4-aminophenol derivatives, accidental (unintentional), initial encounter (06/12/2018), Radiculopathy, cervical region (08/06/2018), Radiculopathy, cervical region (08/14/2018), Sepsis, unspecified organism (Multi), Synovial cyst of popliteal space ([place]), right knee (11/30/2016), Unspecified disorder of eyelid (08/22/2018), Unspecified fracture of shaft of unspecified tibia, initial encounter for closed fracture (05/21/2021), Unspecified fracture of unspecified calcaneus, initial encounter for closed fracture (11/09/2022), Unspecified injury of head, subsequent encounter (01/09/2020), and Unspecified injury of neck, initial encounter (06/12/2018).    Past Surgical History:  He has a past surgical history that includes Thyroid surgery (11/16/2015); Other surgical history (11/09/2022); Hernia repair (05/21/2021); Other surgical history (09/20/2019); Other surgical history (09/20/2019); Other surgical history (05/19/2022); CT angio coronary art with heartflow if score >30% (3/21/2022); CT guided percutaneous peritoneal or retroperitoneal fluid collection drainage (8/22/2019); and CT guided chest tube placement (8/21/2019).    Social History:  He reports that he quit smoking about 28 years ago. His smoking use included cigarettes. He has never used smokeless tobacco. He reports current alcohol use of about 9.0 standard drinks of alcohol per week. He reports current drug use. Drug: Marijuana.    Family History:  Family History   Problem Relation Name Age of Onset    Diabetes Father      Other (CARDIAC DISORDER) Father      Arthritis Other FAMILY HISTORY     Psoriasis Other FAMILY HISTORY      Allergies:  Prednisone    DIABETES MELLITUS TYPE 2:    Does patient follow with Endocrinology: No  Does patient follow with  "Nephrology: No  Does pt have proteinuria? Yes    Lifestyle:   He is still staying with a friend - Shay   At the time of our visit he is at his home - he notes that it is stressful  He has a cat at his home that he takes care of as well   He generally eating breakfast with a light lunch and light dinner  His breakfast today included eggs, camarena and toast. This is a typical breakfast for him.   Fluids: Water, orange juice.   [facility] visit completed on 3/3/2025 - follow up on 4/4/2025     Tobacco Use: None  Alcohol: Coffee with rum (1-2 shots) or 2 tall cans of beer \"couple times per week\" ; No overall reported change since 1/27  Illicit Drug Use: Smoking Marijuana - \"couple times per week\" ; No overall reported change since 1/27    Current diabetic medications include:  Glimepiride 4 mg: Take one tablet by mouth twice daily.  Trulicity 1.5 mg: Inject 1.5 mg under the skin once weekly on Fridays (Doses remaining: 3; Injection completed on 3/7/2025 and last on 2/28/2025)  Farxiga 10 mg: Take one tablet by mouth once daily in the morning    Historical DM pharmacotherapy:  Metformin use - Unknown per patient  Xigduo Xr 10/1000 mg - Therapy previously prescribed    Clarifications to above regimen:   Glimepiride 4 mg - Patient reports once daily administration    Current hypertension medications include:  Metoprolol succinate 100 mg: Take one tablet by mouth once daily  Lisinopril 40 mg: Take one tablet by mouth once daily   Eplerenone 25 mg: Take one tablet by mouth once daily  Hydralazine 25 mg: Take one tablet by mouth once daily     Clarifications to above regimen:   None    Current hyperlipidemia medications include:  Ezetimibe 10 mg: Take one tablet by mouth once daily   Rosuvastatin 20 mg: Take one tablet by mouth once daily     Clarifications to above regimen:   Ezetimibe 10 mg: Take one tablet by mouth once daily - Not taking    Adverse Effects:   Nausea without vomiting or diarrhea. Denies constipation. "     Adherence/Organization:  - AEMR Fill History Reviewed: yes  - How often on an average per week does the patient forget to take a dose of their medications?   - Do you utilize auto-fill services? No  - Do you use a pillbox at home?  yes - Once weekly  - Is it weekly or monthly? Weekly   - Do you prepare your own pillbox? Yes    Glucose Readings:  Glucometer/CGM Type: None  CGM monitoring supplies - None    Current home BG readings:   03/07/2025:  176 mg/dL 30 minutes after eating cheeseburger     Previous home BG readings: N/A     Any episodes of hypoglycemia? N/A.      Blood pressure Readings:  Monitor: Upper Arm Cuff  Monitoring Frequency: 1-2 times per day x 7 days  Timing of antihypertensive administration: 10 am     Blood pressure unavailable for interpretation     Secondary Prevention:  Statin? Yes  ACE-I/ARB? Yes  Aspirin? Yes    Pertinent PMH Review  PMH of Pancreatitis: No  PMH of Retinopathy: No  PMH of Recurrent Urinary Tract Infections: No   PMH of MTC/MEN type II: No    Drug Interactions:  Eplerenone + Lisinopril: Monitor potassium, enhanced hyperkalemic effect when agents are combined.    Objective     Last Recorded Vitals:  BP Readings from Last 6 Encounters:   11/13/24 (!) 152/102   06/28/24 (!) 178/115   11/08/23 (!) 147/103   11/03/23 (!) 158/100   10/10/23 (!) 151/101   07/07/23 130/85      Wt Readings from Last 6 Encounters:   11/13/24 105 kg (232 lb)   06/27/24 104 kg (230 lb)   11/08/23 107 kg (236 lb 12.8 oz)   11/03/23 108 kg (238 lb)   10/10/23 110 kg (242 lb 8.1 oz)   07/07/23 109 kg (240 lb 6 oz)     BMI Readings from Last 6 Encounters:   11/13/24 31.46 kg/m²   06/27/24 31.19 kg/m²   11/08/23 32.12 kg/m²   11/03/23 32.28 kg/m²   10/10/23 32.89 kg/m²   07/07/23 29.26 kg/m²     Lab Review  Lab Results   Component Value Date    BILITOT 0.5 11/11/2024    CALCIUM 9.6 01/07/2025    CO2 25 01/07/2025     01/07/2025    CREATININE 1.00 01/07/2025    GLUCOSE 135 (H) 01/07/2025    ALKPHOS 59  11/11/2024    K 4.1 01/07/2025    PROT 7.4 01/07/2025     01/07/2025    AST 31 11/11/2024    ALT 40 11/11/2024    BUN 22 01/07/2025    ANIONGAP 12 01/07/2025    MG 1.75 11/23/2021    PHOS 3.9 01/07/2025     08/20/2019    ALBUMIN 4.8 01/07/2025    AMYLASE 70 12/31/2020    LIPASE 18 04/05/2021    GFRMALE 70 05/09/2023     Lab Results   Component Value Date    TRIG 284 (H) 11/11/2024    CHOL 156 11/11/2024    LDLCALC 51 11/11/2024    HDL 48.2 11/11/2024     Lab Results   Component Value Date    HGBA1C 8.1 (H) 11/11/2024    HGBA1C 8.4 (H) 11/07/2023    HGBA1C 9.0 (A) 11/03/2023     Component      Latest Ref Rng 5/9/2023 11/7/2023 11/11/2024   ALBUMIN (MG/L) IN URINE      Not Established mg/L 187.7      Albumin/Creatinine Ratio      <30.0 ug/mg Creat 460.0 (H)  964.0 (H)  768.9 (H)    Creatinine, Urine Random      20.0 - 370.0 mg/dL 40.8  74.2  111.0    Albumin, Urine Random      Not established mg/L  715.3  853.5       Legend:  (H) High    The ASCVD Risk score (Jonathan DK, et al., 2019) failed to calculate for the following reasons:    Risk score cannot be calculated because patient has a medical history suggesting prior/existing ASCVD    Assessment/Plan   Problem List Items Addressed This Visit       Diabetes mellitus type 2 without retinopathy (Multi) - Primary     Uncontrolled and above goal with most recent A1c of 8.1% on 11/11/2024 improved from 8.4% on 11/7/2023 (Goal < 7%). Patient exhibits proteinuria despite prescribed therapy with maximally dosed Lisinopril and Farxiga 10 mg. No changes today. A single glucose reading was provided today at 176 mg/dL 30 minutes after eating a cheeseburger. Advised to continue current regimen as prescribed. He reported once daily administration of glimepiride. Advised to increase to twice daily. Administer before meals. Follow up after [facility] visit. Complete repeat laboratory studies.          Relevant Orders    Referral to Clinical Pharmacy    Benign essential  hypertension     Adherence reported to current antihypertensive therapy. Blood pressure values not provided for interpretation during telehealth visit.           Other Visit Diagnoses       Essential (primary) hypertension        Relevant Orders    Referral to Clinical Pharmacy    Type 2 diabetes mellitus with other diabetic neurological complication        Relevant Orders    Referral to Clinical Pharmacy    DM (diabetes mellitus) type 2, uncontrolled, with ketoacidosis        Relevant Orders    Referral to Clinical Pharmacy          Follow-up: I recommend diabetes care be 04/11/2025 at 2 pm.  Cardiology Follow-Up: 4/7/2025    Continue all meds under the continuation of care with the referring provider and clinical pharmacy team.

## 2025-03-10 NOTE — ASSESSMENT & PLAN NOTE
Uncontrolled and above goal with most recent A1c of 8.1% on 11/11/2024 improved from 8.4% on 11/7/2023 (Goal < 7%). Patient exhibits proteinuria despite prescribed therapy with maximally dosed Lisinopril and Farxiga 10 mg. No changes today. A single glucose reading was provided today at 176 mg/dL 30 minutes after eating a cheeseburger. Advised to continue current regimen as prescribed. He reported once daily administration of glimepiride. Advised to increase to twice daily. Administer before meals. Follow up after [facility] hospital visit. Complete repeat laboratory studies.

## 2025-03-10 NOTE — ASSESSMENT & PLAN NOTE
Adherence reported to current antihypertensive therapy. Blood pressure values not provided for interpretation during telehealth visit.

## 2025-03-10 NOTE — ASSESSMENT & PLAN NOTE
Patients diabetes is uncontrolled and above goal with most recent A1c of 8.1% on 11/11/2024 improved from 8.4% on 11/7/2023 (Goal < 7%). Patient exhibits proteinuria despite prescribed therapy with maximally dosed Lisinopril and Farxiga 10 mg. No changes today. Glucose readings unavailable. Blood pressure readings unavailable. Injectable therapy not resumed. Oral therapy adherent.

## 2025-03-12 ENCOUNTER — PATIENT OUTREACH (OUTPATIENT)
Dept: CARE COORDINATION | Facility: CLINIC | Age: 60
End: 2025-03-12
Payer: COMMERCIAL

## 2025-03-12 ENCOUNTER — PHARMACY VISIT (OUTPATIENT)
Dept: PHARMACY | Facility: CLINIC | Age: 60
End: 2025-03-12
Payer: MEDICAID

## 2025-03-12 NOTE — PROGRESS NOTES
Outreach to patient for follow up.   Check in regarding additional services.   [facility]  Scheduled follow up.   SHIRA Rivera  [facility]    Contact: [phone]

## 2025-03-24 ENCOUNTER — TELEPHONE (OUTPATIENT)
Dept: PRIMARY CARE | Facility: CLINIC | Age: 60
End: 2025-03-24
Payer: COMMERCIAL

## 2025-03-25 ENCOUNTER — PATIENT OUTREACH (OUTPATIENT)
Dept: CARE COORDINATION | Facility: CLINIC | Age: 60
End: 2025-03-25
Payer: COMMERCIAL

## 2025-03-25 NOTE — PROGRESS NOTES
Outreach to pt for care management for check in   Reports he is not regressing or progressing but the days are moving forward.   Confirmed upcoming appointments for medical and with .   Declines MH at this time.   Scheduled check in.   SHIRA Rivera  AC[facility]    Contact: [phone]

## 2025-04-04 ENCOUNTER — CLINICAL SUPPORT (OUTPATIENT)
Facility: HOSPITAL | Age: 60
End: 2025-04-04
Payer: COMMERCIAL

## 2025-04-04 NOTE — PROGRESS NOTES
[facility]  Diet Recommendation 1: Healthy Eating  Diet Recommendation 2: Diabetes  Food Intolerance Avoidance: NKFA  Household Size: 1 Family Member  Interventions: Referral Number: 2nd 6 Mo Referral 1 yr (Referrals may not be consecutive)  Interventions: Visit Number: 3 of 6 Visits - Max 6 Visits/Referral Each 6 Mo Period  Grains: Whole - 100%  Fruit: Fresh - 100%  Vegetables: Fresh - 100%  Proteins: 1-2 Plant-based Items  Dairy: Lowfat - 100%  Originating Site of Referral Order: [id number]  Initials of RD Assisting Today: [facility]

## 2025-04-07 ENCOUNTER — HOSPITAL ENCOUNTER (OUTPATIENT)
Dept: CARDIOLOGY | Facility: CLINIC | Age: 60
Discharge: HOME | End: 2025-04-07
Payer: COMMERCIAL

## 2025-04-07 ENCOUNTER — OFFICE VISIT (OUTPATIENT)
Dept: CARDIOLOGY | Facility: CLINIC | Age: 60
End: 2025-04-07
Payer: COMMERCIAL

## 2025-04-07 VITALS
WEIGHT: 235.6 LBS | DIASTOLIC BLOOD PRESSURE: 83 MMHG | SYSTOLIC BLOOD PRESSURE: 135 MMHG | OXYGEN SATURATION: 94 % | HEIGHT: 72 IN | HEART RATE: 92 BPM | BODY MASS INDEX: 31.91 KG/M2

## 2025-04-07 DIAGNOSIS — E78.2 HYPERLIPEMIA, MIXED: Primary | ICD-10-CM

## 2025-04-07 PROCEDURE — 99214 OFFICE O/P EST MOD 30 MIN: CPT | Performed by: INTERNAL MEDICINE

## 2025-04-07 PROCEDURE — 3075F SYST BP GE 130 - 139MM HG: CPT | Performed by: INTERNAL MEDICINE

## 2025-04-07 PROCEDURE — 93010 ELECTROCARDIOGRAM REPORT: CPT | Performed by: INTERNAL MEDICINE

## 2025-04-07 PROCEDURE — 93005 ELECTROCARDIOGRAM TRACING: CPT

## 2025-04-07 PROCEDURE — 3060F POS MICROALBUMINURIA REV: CPT | Performed by: INTERNAL MEDICINE

## 2025-04-07 PROCEDURE — 3079F DIAST BP 80-89 MM HG: CPT | Performed by: INTERNAL MEDICINE

## 2025-04-07 PROCEDURE — 4010F ACE/ARB THERAPY RXD/TAKEN: CPT | Performed by: INTERNAL MEDICINE

## 2025-04-07 PROCEDURE — 1036F TOBACCO NON-USER: CPT | Performed by: INTERNAL MEDICINE

## 2025-04-07 PROCEDURE — 3008F BODY MASS INDEX DOCD: CPT | Performed by: INTERNAL MEDICINE

## 2025-04-07 PROCEDURE — 99214 OFFICE O/P EST MOD 30 MIN: CPT | Mod: 25 | Performed by: INTERNAL MEDICINE

## 2025-04-07 ASSESSMENT — PAIN SCALES - GENERAL: PAINLEVEL_OUTOF10: 0-NO PAIN

## 2025-04-07 ASSESSMENT — PATIENT HEALTH QUESTIONNAIRE - PHQ9
5. POOR APPETITE OR OVEREATING: NOT AT ALL
3. TROUBLE FALLING OR STAYING ASLEEP OR SLEEPING TOO MUCH: NEARLY EVERY DAY
7. TROUBLE CONCENTRATING ON THINGS, SUCH AS READING THE NEWSPAPER OR WATCHING TELEVISION: NEARLY EVERY DAY
2. FEELING DOWN, DEPRESSED OR HOPELESS: NEARLY EVERY DAY
SUM OF ALL RESPONSES TO PHQ9 QUESTIONS 1 AND 2: 6
4. FEELING TIRED OR HAVING LITTLE ENERGY: NEARLY EVERY DAY
1. LITTLE INTEREST OR PLEASURE IN DOING THINGS: NEARLY EVERY DAY
6. FEELING BAD ABOUT YOURSELF - OR THAT YOU ARE A FAILURE OR HAVE LET YOURSELF OR YOUR FAMILY DOWN: NEARLY EVERY DAY
10. IF YOU CHECKED OFF ANY PROBLEMS, HOW DIFFICULT HAVE THESE PROBLEMS MADE IT FOR YOU TO DO YOUR WORK, TAKE CARE OF THINGS AT HOME, OR GET ALONG WITH OTHER PEOPLE: EXTREMELY DIFFICULT
8. MOVING OR SPEAKING SO SLOWLY THAT OTHER PEOPLE COULD HAVE NOTICED. OR THE OPPOSITE, BEING SO FIGETY OR RESTLESS THAT YOU HAVE BEEN MOVING AROUND A LOT MORE THAN USUAL: NOT AT ALL

## 2025-04-07 ASSESSMENT — ENCOUNTER SYMPTOMS
DEPRESSION: 0
OCCASIONAL FEELINGS OF UNSTEADINESS: 0
LOSS OF SENSATION IN FEET: 0
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
## 2025-04-09 LAB
ATRIAL RATE: 92 BPM
P AXIS: 31 DEGREES
P OFFSET: 183 MS
P ONSET: 129 MS
PR INTERVAL: 160 MS
Q ONSET: 209 MS
QRS COUNT: 15 BEATS
QRS DURATION: 94 MS
QT INTERVAL: 362 MS
QTC CALCULATION(BAZETT): 447 MS
QTC FREDERICIA: 417 MS
R AXIS: -7 DEGREES
T AXIS: 29 DEGREES
T OFFSET: 390 MS
VENTRICULAR RATE: 92 BPM

## 2025-04-11 ENCOUNTER — APPOINTMENT (OUTPATIENT)
Dept: PHARMACY | Facility: HOSPITAL | Age: 60
End: 2025-04-11
Payer: COMMERCIAL

## 2025-04-11 DIAGNOSIS — E11.49 TYPE 2 DIABETES MELLITUS WITH OTHER DIABETIC NEUROLOGICAL COMPLICATION: ICD-10-CM

## 2025-04-11 DIAGNOSIS — I10 ESSENTIAL (PRIMARY) HYPERTENSION: ICD-10-CM

## 2025-04-11 DIAGNOSIS — E11.10 DM (DIABETES MELLITUS) TYPE 2, UNCONTROLLED, WITH KETOACIDOSIS: ICD-10-CM

## 2025-04-11 DIAGNOSIS — R06.02 SHORT OF BREATH ON EXERTION: ICD-10-CM

## 2025-04-11 DIAGNOSIS — E11.9 DIABETES MELLITUS TYPE 2 WITHOUT RETINOPATHY (MULTI): Primary | ICD-10-CM

## 2025-04-11 PROCEDURE — RXMED WILLOW AMBULATORY MEDICATION CHARGE

## 2025-04-11 RX ORDER — METOPROLOL SUCCINATE 100 MG/1
TABLET, EXTENDED RELEASE ORAL
Qty: 90 TABLET | Refills: 0 | Status: SHIPPED | OUTPATIENT
Start: 2025-04-11

## 2025-04-11 NOTE — PROGRESS NOTES
Patient is sent at the request of Isaias Lorenzo MD for my opinion regarding Type 2 diabetes.  My final recommendations will be communicated back to the requesting provider by way of shared medical record.    Subjective     Maurice Sterling is a 59 y.o. year old male patient with type two diabetes mellitus, CKD G2/A3, coronary artery disease, class I obesity (BMI 31.46 kg/m2), polysubstance abuse, bipolar disorder and hx of papillary thyroid carcinoma referred for medication management. He completed a follow up visit with Dr. Lorenzo on 4/7/2025. He completed repeat laboratory studies on 11/11/2024 with an elevated A1c at 8.1% improved from previous panel. He is currently prescribed glimepiride 4 mg twice daily, Trulicity 1.5 mg once weekly, and Farxiga 10 mg once daily in the setting of type two diabetes. He is prescribed lisinopril 40 mg once daily, Metoprolol succinate 100 mg once daily, eplerenone 25 mg daily and hydralazine 25 mg once daily in the setting of hypertension. Today we are reviewing his adherence to medication therapy, glucose readings and blood pressure values.     Diabetes  He presents for his initial diabetic visit. He has type 2 diabetes mellitus. His disease course has been improving. There are no hypoglycemic associated symptoms. Diabetic complications include heart disease and nephropathy. There is no compliance with monitoring of blood glucose. An ACE inhibitor/angiotensin II receptor blocker is being taken.     Past Medical History:  He has a past medical history of Attention-deficit hyperactivity disorder, predominantly inattentive type (02/25/2022), Body mass index (BMI) 25.0-25.9, adult, Body mass index (BMI) 26.0-26.9, adult, Body mass index (BMI) 29.0-29.9, adult (05/07/2021), Body mass index (BMI) 31.0-31.9, adult (09/24/2021), Body mass index (BMI) 31.0-31.9, adult (10/22/2021), Body mass index (BMI) 32.0-32.9, adult (11/23/2021), Body mass index (BMI) 34.0-34.9, adult (05/21/2021),  Body mass index (BMI) 34.0-34.9, adult, Body mass index (BMI)30.0-30.9, adult (07/28/2022), Body mass index (BMI)30.0-30.9, adult (02/25/2022), Calculus of kidney (08/23/2018), Cervicalgia (08/14/2018), Cervicalgia (08/14/2018), Chills (without fever) (08/14/2018), Dorsalgia, unspecified (11/16/2015), Encounter for issue of repeat prescription (05/07/2021), Encounter for other preprocedural examination (02/15/2022), Lower abdominal pain, unspecified (09/24/2021), Obesity, unspecified (06/07/2022), Otalgia, right ear (07/28/2022), Other chest pain (05/13/2022), Other conditions influencing health status (10/31/2019), Other conditions influencing health status, Other intervertebral disc degeneration, lumbosacral region (11/16/2015), Pain in left shoulder (10/04/2022), Personal history of (healed) traumatic fracture (11/09/2022), Personal history of (healed) traumatic fracture (11/09/2022), Personal history of (healed) traumatic fracture (11/09/2022), Personal history of (healed) traumatic fracture (11/09/2022), Personal history of Methicillin resistant Staphylococcus aureus infection (12/29/2016), Personal history of other (healed) physical injury and trauma (11/09/2022), Personal history of other diseases of the digestive system (10/14/2021), Personal history of other diseases of the musculoskeletal system and connective tissue (06/12/2018), Personal history of other diseases of the musculoskeletal system and connective tissue (11/09/2022), Personal history of other diseases of the nervous system and sense organs (07/28/2022), Personal history of other diseases of the nervous system and sense organs (08/01/2022), Personal history of other endocrine, nutritional and metabolic disease (09/26/2022), Personal history of other endocrine, nutritional and metabolic disease (01/09/2020), Personal history of other endocrine, nutritional and metabolic disease (08/14/2018), Personal history of other endocrine, nutritional and  metabolic disease, Personal history of other infectious and parasitic diseases (09/20/2019), Personal history of other specified conditions (06/07/2022), Poisoning by 4-aminophenol derivatives, accidental (unintentional), initial encounter (06/12/2018), Radiculopathy, cervical region (08/06/2018), Radiculopathy, cervical region (08/14/2018), Sepsis, unspecified organism (Multi), Synovial cyst of popliteal space ([place]), right knee (11/30/2016), Unspecified disorder of eyelid (08/22/2018), Unspecified fracture of shaft of unspecified tibia, initial encounter for closed fracture (05/21/2021), Unspecified fracture of unspecified calcaneus, initial encounter for closed fracture (11/09/2022), Unspecified injury of head, subsequent encounter (01/09/2020), and Unspecified injury of neck, initial encounter (06/12/2018).    Past Surgical History:  He has a past surgical history that includes Thyroid surgery (11/16/2015); Other surgical history (11/09/2022); Hernia repair (05/21/2021); Other surgical history (09/20/2019); Other surgical history (09/20/2019); Other surgical history (05/19/2022); CT angio coronary art with heartflow if score >30% (3/21/2022); CT guided percutaneous peritoneal or retroperitoneal fluid collection drainage (8/22/2019); and CT guided chest tube placement (8/21/2019).    Social History:  He reports that he quit smoking about 28 years ago. His smoking use included cigarettes. He has never used smokeless tobacco. He reports current alcohol use of about 9.0 standard drinks of alcohol per week. He reports current drug use. Drug: Marijuana.    Family History:  Family History   Problem Relation Name Age of Onset    Diabetes Father      Other (CARDIAC DISORDER) Father      Arthritis Other FAMILY HISTORY     Psoriasis Other FAMILY HISTORY      Allergies:  Prednisone    DIABETES MELLITUS TYPE 2:    Does patient follow with Endocrinology: No  Does patient follow with Nephrology: No  Does pt have proteinuria?  "Yes    Lifestyle:   He has a cat at his home that he takes care of  He generally eating breakfast with a light lunch and light dinner  His breakfast today included eggs, camarena and toast. This is a typical breakfast for him.   Fluids: Water, orange juice.     Tobacco Use: None  Alcohol: Coffee with rum (1-2 shots) or 2 tall cans of beer \"couple times per week\" ; No overall reported change since   Illicit Drug Use: Smoking Marijuana - \"couple times per week\" ; No overall reported change since     Current diabetic medications include:  Glimepiride 4 mg: Take one tablet by mouth twice daily.  Trulicity 1.5 mg: Inject 1.5 mg under the skin once weekly on  (Doses remainin; Last injection:   Farxiga 10 mg: Take one tablet by mouth once daily in the morning    Historical DM pharmacotherapy:  Metformin use - Unknown per patient  Xigduo Xr 10/1000 mg - Therapy previously prescribed    Clarifications to above regimen:   Glimepiride 4 mg - Patient reports once daily administration    Current hypertension medications include:  Metoprolol succinate 100 mg: Take one tablet by mouth once daily  Lisinopril 40 mg: Take one tablet by mouth once daily   Eplerenone 25 mg: Take one tablet by mouth once daily  Hydralazine 25 mg: Take one tablet by mouth once daily     Clarifications to above regimen:   None    Current hyperlipidemia medications include:  Ezetimibe 10 mg: Take one tablet by mouth once daily   Rosuvastatin 20 mg: Take one tablet by mouth once daily     Clarifications to above regimen:   Ezetimibe 10 mg: Take one tablet by mouth once daily - Not taking    Adverse Effects:   Nausea without vomiting or diarrhea. Denies constipation.     Adherence/Organization:  - AEMR Fill History Reviewed: yes  - How often on an average per week does the patient forget to take a dose of their medications?   - Do you utilize auto-fill services? No  - Do you use a pillbox at home?  yes - Once weekly  - Is it weekly or " monthly? Weekly   - Do you prepare your own pillbox? Yes    Glucose Readings:  Glucometer/CGM Type: None  CGM monitoring supplies - None    Current home BG readings:   None     Previous home BG readings: N/A     Any episodes of hypoglycemia? N/A.      Blood pressure Readings:  Monitor: Upper Arm Cuff  Monitoring Frequency: 1-2 times per day x 7 days  Timing of antihypertensive administration: 10 am     Blood pressure unavailable for interpretation     Secondary Prevention:  Statin? Yes  ACE-I/ARB? Yes  Aspirin? Yes    Pertinent PMH Review  PMH of Pancreatitis: No  PMH of Retinopathy: No  PMH of Recurrent Urinary Tract Infections: No   PMH of MTC/MEN type II: No    Drug Interactions:  Eplerenone + Lisinopril: Monitor potassium, enhanced hyperkalemic effect when agents are combined.    Objective     Last Recorded Vitals:  BP Readings from Last 6 Encounters:   04/07/25 135/83   11/13/24 (!) 152/102   06/28/24 (!) 178/115   11/08/23 (!) 147/103   11/03/23 (!) 158/100   10/10/23 (!) 151/101      Wt Readings from Last 6 Encounters:   04/07/25 107 kg (235 lb 9.6 oz)   11/13/24 105 kg (232 lb)   06/27/24 104 kg (230 lb)   11/08/23 107 kg (236 lb 12.8 oz)   11/03/23 108 kg (238 lb)   10/10/23 110 kg (242 lb 8.1 oz)     BMI Readings from Last 6 Encounters:   04/07/25 31.95 kg/m²   11/13/24 31.46 kg/m²   06/27/24 31.19 kg/m²   11/08/23 32.12 kg/m²   11/03/23 32.28 kg/m²   10/10/23 32.89 kg/m²     Lab Review  Lab Results   Component Value Date    BILITOT 0.5 11/11/2024    CALCIUM 9.6 01/07/2025    CO2 25 01/07/2025     01/07/2025    CREATININE 1.00 01/07/2025    GLUCOSE 135 (H) 01/07/2025    ALKPHOS 59 11/11/2024    K 4.1 01/07/2025    PROT 7.4 01/07/2025     01/07/2025    AST 31 11/11/2024    ALT 40 11/11/2024    BUN 22 01/07/2025    ANIONGAP 12 01/07/2025    MG 1.75 11/23/2021    PHOS 3.9 01/07/2025     08/20/2019    ALBUMIN 4.8 01/07/2025    AMYLASE 70 12/31/2020    LIPASE 18 04/05/2021    GFRMALE 70  05/09/2023     Lab Results   Component Value Date    TRIG 284 (H) 11/11/2024    CHOL 156 11/11/2024    LDLCALC 51 11/11/2024    HDL 48.2 11/11/2024     Lab Results   Component Value Date    HGBA1C 8.1 (H) 11/11/2024    HGBA1C 8.4 (H) 11/07/2023    HGBA1C 9.0 (A) 11/03/2023     Component      Latest Ref Rng 5/9/2023 11/7/2023 11/11/2024   ALBUMIN (MG/L) IN URINE      Not Established mg/L 187.7      Albumin/Creatinine Ratio      <30.0 ug/mg Creat 460.0 (H)  964.0 (H)  768.9 (H)    Creatinine, Urine Random      20.0 - 370.0 mg/dL 40.8  74.2  111.0    Albumin, Urine Random      Not established mg/L  715.3  853.5       Legend:  (H) High    The ASCVD Risk score (Jonathan DK, et al., 2019) failed to calculate for the following reasons:    Risk score cannot be calculated because patient has a medical history suggesting prior/existing ASCVD    Assessment/Plan   Problem List Items Addressed This Visit       Diabetes mellitus type 2 without retinopathy (Multi) - Primary     Uncontrolled and above goal with most recent A1c of 8.1% on 11/11/2024 improved from 8.4% on 11/7/2023 (Goal < 7%). Patient exhibits proteinuria despite prescribed therapy with maximally dosed Lisinopril and Farxiga 10 mg. Today we discussed increasing therapy with Trulicity from 1.5 mg once weekly to 3 mg once weekly.   He increased glimepiride 4 mg to twice daily as advised after last visit. No reported glucose readings available for interpretation during encounter today. He does have glucose monitoring supplies including a glucometer, test strips and lancets. Advised patient to complete laboratory studies as advised at last visit. Complete prior to upcoming PCP visit.          Relevant Medications    dulaglutide (Trulicity) 3 mg/0.5 mL injection    Other Relevant Orders    Referral to Clinical Pharmacy     Other Visit Diagnoses       Short of breath on exertion        Relevant Medications    metoprolol succinate XL (Toprol-XL) 100 mg 24 hr tablet    Other  Relevant Orders    Referral to Clinical Pharmacy    Essential (primary) hypertension        Relevant Medications    metoprolol succinate XL (Toprol-XL) 100 mg 24 hr tablet    Other Relevant Orders    Referral to Clinical Pharmacy    Type 2 diabetes mellitus with other diabetic neurological complication        DM (diabetes mellitus) type 2, uncontrolled, with ketoacidosis              Follow-up: I recommend diabetes care be 04/25/2025 at 1:40 pm.  Cardiology Follow-Up: 7/7/2025  PCP Visit: 4/23/2025    Continue all meds under the continuation of care with the referring provider and clinical pharmacy team.

## 2025-04-11 NOTE — Clinical Note
Advised to complete laboratory studies prior to upcoming visit with Dr. Ortiz on 4/23/2025. Patient is aware to have the laboratory studies completed >24 hours prior to visit. Updated prescription sent for metoprolol xl 100 mg daily today along with dose increase with Trulicity to 3 mg once weekly. Home glucose readings and home blood pressure readings unavailable for interpretation.

## 2025-04-11 NOTE — ASSESSMENT & PLAN NOTE
Uncontrolled and above goal with most recent A1c of 8.1% on 11/11/2024 improved from 8.4% on 11/7/2023 (Goal < 7%). Patient exhibits proteinuria despite prescribed therapy with maximally dosed Lisinopril and Farxiga 10 mg. Today we discussed increasing therapy with Trulicity from 1.5 mg once weekly to 3 mg once weekly.   He increased glimepiride 4 mg to twice daily as advised after last visit. No reported glucose readings available for interpretation during encounter today. He does have glucose monitoring supplies including a glucometer, test strips and lancets. Advised patient to complete laboratory studies as advised at last visit. Complete prior to upcoming PCP visit.

## 2025-04-15 ENCOUNTER — PHARMACY VISIT (OUTPATIENT)
Dept: PHARMACY | Facility: CLINIC | Age: 60
End: 2025-04-15
Payer: MEDICAID

## 2025-04-22 ENCOUNTER — TELEPHONE (OUTPATIENT)
Dept: CARDIOLOGY | Facility: CLINIC | Age: 60
End: 2025-04-22
Payer: COMMERCIAL

## 2025-04-22 LAB
ALBUMIN SERPL-MCNC: 4.5 G/DL (ref 3.6–5.1)
ALBUMIN/CREAT UR: 158 MG/G CREAT
ALP SERPL-CCNC: 49 U/L (ref 35–144)
ALT SERPL-CCNC: 50 U/L (ref 9–46)
ANION GAP SERPL CALCULATED.4IONS-SCNC: 9 MMOL/L (CALC) (ref 7–17)
AST SERPL-CCNC: 39 U/L (ref 10–35)
BASOPHILS # BLD AUTO: 30 CELLS/UL (ref 0–200)
BASOPHILS NFR BLD AUTO: 0.5 %
BILIRUB SERPL-MCNC: 0.4 MG/DL (ref 0.2–1.2)
BUN SERPL-MCNC: 24 MG/DL (ref 7–25)
CALCIUM SERPL-MCNC: 9.5 MG/DL (ref 8.6–10.3)
CHLORIDE SERPL-SCNC: 101 MMOL/L (ref 98–110)
CHOLEST SERPL-MCNC: 177 MG/DL
CHOLEST/HDLC SERPL: 3.9 (CALC)
CO2 SERPL-SCNC: 28 MMOL/L (ref 20–32)
CREAT SERPL-MCNC: 1.13 MG/DL (ref 0.7–1.35)
CREAT UR-MCNC: 43 MG/DL (ref 20–320)
EGFRCR SERPLBLD CKD-EPI 2021: 74 ML/MIN/1.73M2
EOSINOPHIL # BLD AUTO: 148 CELLS/UL (ref 15–500)
EOSINOPHIL NFR BLD AUTO: 2.5 %
ERYTHROCYTE [DISTWIDTH] IN BLOOD BY AUTOMATED COUNT: 13.4 % (ref 11–15)
EST. AVERAGE GLUCOSE BLD GHB EST-MCNC: 177 MG/DL
EST. AVERAGE GLUCOSE BLD GHB EST-SCNC: 9.8 MMOL/L
GLUCOSE SERPL-MCNC: 207 MG/DL (ref 65–99)
HBA1C MFR BLD: 7.8 %
HCT VFR BLD AUTO: 47.6 % (ref 38.5–50)
HDLC SERPL-MCNC: 45 MG/DL
HGB BLD-MCNC: 15.7 G/DL (ref 13.2–17.1)
LDLC SERPL CALC-MCNC: ABNORMAL MG/DL
LYMPHOCYTES # BLD AUTO: 1074 CELLS/UL (ref 850–3900)
LYMPHOCYTES NFR BLD AUTO: 18.2 %
MCH RBC QN AUTO: 30.1 PG (ref 27–33)
MCHC RBC AUTO-ENTMCNC: 33 G/DL (ref 32–36)
MCV RBC AUTO: 91.2 FL (ref 80–100)
MICROALBUMIN UR-MCNC: 6.8 MG/DL
MONOCYTES # BLD AUTO: 631 CELLS/UL (ref 200–950)
MONOCYTES NFR BLD AUTO: 10.7 %
NEUTROPHILS # BLD AUTO: 4018 CELLS/UL (ref 1500–7800)
NEUTROPHILS NFR BLD AUTO: 68.1 %
NONHDLC SERPL-MCNC: 132 MG/DL (CALC)
PLATELET # BLD AUTO: 139 THOUSAND/UL (ref 140–400)
PMV BLD REES-ECKER: 11 FL (ref 7.5–12.5)
POTASSIUM SERPL-SCNC: 4.4 MMOL/L (ref 3.5–5.3)
PROT SERPL-MCNC: 6.8 G/DL (ref 6.1–8.1)
RBC # BLD AUTO: 5.22 MILLION/UL (ref 4.2–5.8)
SODIUM SERPL-SCNC: 138 MMOL/L (ref 135–146)
TRIGL SERPL-MCNC: 537 MG/DL
WBC # BLD AUTO: 5.9 THOUSAND/UL (ref 3.8–10.8)

## 2025-04-22 NOTE — TELEPHONE ENCOUNTER
----- Message from Isaias Lorenzo sent at 4/22/2025 12:54 PM EDT -----  Please let him know there are some concerning abnormalities on his blood work.  His A1c is too high triglycerides are too high today and just 2 issues really has to do better on lifestyle and diet.  Some of his liver enzymes were elevated suggesting fatty liver  ----- Message -----  From: [facility] Results In  Sent: 4/22/2025   5:09 AM EDT  To: Isaias Lorenzo MD

## 2025-04-22 NOTE — TELEPHONE ENCOUNTER
Result Communication    Resulted Orders   Albumin-Creatinine Ratio, Urine Random   Result Value Ref Range    CREATININE, RANDOM URINE 43 20 - 320 mg/dL    ALBUMIN, URINE 6.8 See Note: mg/dL      Comment:      Reference Range:    Reference Range  Not established      ALBUMIN/CREATININE RATIO, RANDOM URINE 158 (H) <30 mg/g creat      Comment:         The ADA defines abnormalities in albumin  excretion as follows:     Albuminuria Category        Result (mg/g creatinine)     Normal to Mildly increased   <30  Moderately increased            Severely increased           > OR = 300     The ADA recommends that at least two of three  specimens collected within a 3-6 month period be  abnormal before considering a patient to be  within a diagnostic category.     CBC and Auto Differential   Result Value Ref Range    WHITE BLOOD CELL COUNT 5.9 3.8 - 10.8 Thousand/uL    RED BLOOD CELL COUNT 5.22 4.20 - 5.80 Million/uL    HEMOGLOBIN 15.7 13.2 - 17.1 g/dL    HEMATOCRIT 47.6 38.5 - 50.0 %    MCV 91.2 80.0 - 100.0 fL    MCH 30.1 27.0 - 33.0 pg    MCHC 33.0 32.0 - 36.0 g/dL      Comment:      For adults, a slight decrease in the calculated MCHC  value (in the range of 30 to 32 g/dL) is most likely  not clinically significant; however, it should be  interpreted with caution in correlation with other  red cell parameters and the patient's clinical  condition.      RDW 13.4 11.0 - 15.0 %    PLATELET COUNT 139 (L) 140 - 400 Thousand/uL    MPV 11.0 7.5 - 12.5 fL    ABSOLUTE NEUTROPHILS 4,018 1,500 - 7,800 cells/uL    ABSOLUTE LYMPHOCYTES 1,074 850 - 3,900 cells/uL    ABSOLUTE MONOCYTES 631 200 - 950 cells/uL    ABSOLUTE EOSINOPHILS 148 15 - 500 cells/uL    ABSOLUTE BASOPHILS 30 0 - 200 cells/uL    NEUTROPHILS 68.1 %    LYMPHOCYTES 18.2 %    MONOCYTES 10.7 %    EOSINOPHILS 2.5 %    BASOPHILS 0.5 %   Comprehensive Metabolic Panel   Result Value Ref Range    GLUCOSE 207 (H) 65 - 99 mg/dL      Comment:                    Fasting  reference interval     For someone without known diabetes, a glucose  value >125 mg/dL indicates that they may have  diabetes and this should be confirmed with a  follow-up test.         UREA NITROGEN (BUN) 24 7 - 25 mg/dL    CREATININE 1.13 0.70 - 1.35 mg/dL    EGFR 74 > OR = 60 mL/min/1.73m2    SODIUM 138 135 - 146 mmol/L    POTASSIUM 4.4 3.5 - 5.3 mmol/L    CHLORIDE 101 98 - 110 mmol/L    CARBON DIOXIDE 28 20 - 32 mmol/L    ELECTROLYTE BALANCE 9 7 - 17 mmol/L (calc)    CALCIUM 9.5 8.6 - 10.3 mg/dL    PROTEIN, TOTAL 6.8 6.1 - 8.1 g/dL    ALBUMIN 4.5 3.6 - 5.1 g/dL    BILIRUBIN, TOTAL 0.4 0.2 - 1.2 mg/dL    ALKALINE PHOSPHATASE 49 35 - 144 U/L    AST 39 (H) 10 - 35 U/L    ALT 50 (H) 9 - 46 U/L   Lipid Panel   Result Value Ref Range    CHOLESTEROL, TOTAL 177 <200 mg/dL    HDL CHOLESTEROL 45 > OR = 40 mg/dL    TRIGLYCERIDES 537 (H) <150 mg/dL      Comment:         If a non-fasting specimen was collected, consider  repeat triglyceride testing on a fasting specimen  if clinically indicated.   Rausch et al. J. of Clin. Lipidol. 2015;9:129-169.        There is increased risk of pancreatitis when the   triglyceride concentration is very high   (> or = 500 mg/dL, especially if > or = 1000 mg/dL).   Rausch et al. J. of Clin. Lipidol. 2015;9:129-169.         LDL-CHOLESTEROL        Comment:         LDL cholesterol not calculated. Triglyceride levels  greater than 400 mg/dL invalidate calculated LDL results.           Reference range: <100     Desirable range <100 mg/dL for primary prevention;    <70 mg/dL for patients with CHD or diabetic patients   with > or = 2 CHD risk factors.     LDL-C is now calculated using the Alistair   calculation, which is a validated novel method providing   better accuracy than the Friedewald equation in the   estimation of LDL-C.   Yimi SCHWARZ et al. TRANG. 2013;310(19): 9381-0593   (http://education.[employer]/faq/JHP556)      CHOL/HDLC RATIO 3.9 <5.0 (calc)    NON HDL  CHOLESTEROL 132 (H) <130 mg/dL (calc)      Comment:      For patients with diabetes plus 1 major ASCVD risk   factor, treating to a non-HDL-C goal of <100 mg/dL   (LDL-C of <70 mg/dL) is considered a therapeutic   option.     Hemoglobin A1C   Result Value Ref Range    HEMOGLOBIN A1c 7.8 (H) <5.7 %      Comment:      For someone without known diabetes, a hemoglobin A1c  value of 6.5% or greater indicates that they may have   diabetes and this should be confirmed with a follow-up   test.     For someone with known diabetes, a value <7% indicates   that their diabetes is well controlled and a value   greater than or equal to 7% indicates suboptimal   control. A1c targets should be individualized based on   duration of diabetes, age, comorbid conditions, and   other considerations.     Currently, no consensus exists regarding use of  hemoglobin A1c for diagnosis of diabetes for children.          eAG (mg/dL) 177 mg/dL    eAG (mmol/L) 9.8 mmol/L       1:23 PM      Results were successfully communicated with the patient and they acknowledged their understanding.  Result Communication    Resulted Orders   Albumin-Creatinine Ratio, Urine Random   Result Value Ref Range    CREATININE, RANDOM URINE 43 20 - 320 mg/dL    ALBUMIN, URINE 6.8 See Note: mg/dL      Comment:      Reference Range:    Reference Range  Not established      ALBUMIN/CREATININE RATIO, RANDOM URINE 158 (H) <30 mg/g creat      Comment:         The ADA defines abnormalities in albumin  excretion as follows:     Albuminuria Category        Result (mg/g creatinine)     Normal to Mildly increased   <30  Moderately increased            Severely increased           > OR = 300     The ADA recommends that at least two of three  specimens collected within a 3-6 month period be  abnormal before considering a patient to be  within a diagnostic category.     CBC and Auto Differential   Result Value Ref Range    WHITE BLOOD CELL COUNT 5.9 3.8 - 10.8 Thousand/uL     RED BLOOD CELL COUNT 5.22 4.20 - 5.80 Million/uL    HEMOGLOBIN 15.7 13.2 - 17.1 g/dL    HEMATOCRIT 47.6 38.5 - 50.0 %    MCV 91.2 80.0 - 100.0 fL    MCH 30.1 27.0 - 33.0 pg    MCHC 33.0 32.0 - 36.0 g/dL      Comment:      For adults, a slight decrease in the calculated MCHC  value (in the range of 30 to 32 g/dL) is most likely  not clinically significant; however, it should be  interpreted with caution in correlation with other  red cell parameters and the patient's clinical  condition.      RDW 13.4 11.0 - 15.0 %    PLATELET COUNT 139 (L) 140 - 400 Thousand/uL    MPV 11.0 7.5 - 12.5 fL    ABSOLUTE NEUTROPHILS 4,018 1,500 - 7,800 cells/uL    ABSOLUTE LYMPHOCYTES 1,074 850 - 3,900 cells/uL    ABSOLUTE MONOCYTES 631 200 - 950 cells/uL    ABSOLUTE EOSINOPHILS 148 15 - 500 cells/uL    ABSOLUTE BASOPHILS 30 0 - 200 cells/uL    NEUTROPHILS 68.1 %    LYMPHOCYTES 18.2 %    MONOCYTES 10.7 %    EOSINOPHILS 2.5 %    BASOPHILS 0.5 %   Comprehensive Metabolic Panel   Result Value Ref Range    GLUCOSE 207 (H) 65 - 99 mg/dL      Comment:                    Fasting reference interval     For someone without known diabetes, a glucose  value >125 mg/dL indicates that they may have  diabetes and this should be confirmed with a  follow-up test.         UREA NITROGEN (BUN) 24 7 - 25 mg/dL    CREATININE 1.13 0.70 - 1.35 mg/dL    EGFR 74 > OR = 60 mL/min/1.73m2    SODIUM 138 135 - 146 mmol/L    POTASSIUM 4.4 3.5 - 5.3 mmol/L    CHLORIDE 101 98 - 110 mmol/L    CARBON DIOXIDE 28 20 - 32 mmol/L    ELECTROLYTE BALANCE 9 7 - 17 mmol/L (calc)    CALCIUM 9.5 8.6 - 10.3 mg/dL    PROTEIN, TOTAL 6.8 6.1 - 8.1 g/dL    ALBUMIN 4.5 3.6 - 5.1 g/dL    BILIRUBIN, TOTAL 0.4 0.2 - 1.2 mg/dL    ALKALINE PHOSPHATASE 49 35 - 144 U/L    AST 39 (H) 10 - 35 U/L    ALT 50 (H) 9 - 46 U/L   Lipid Panel   Result Value Ref Range    CHOLESTEROL, TOTAL 177 <200 mg/dL    HDL CHOLESTEROL 45 > OR = 40 mg/dL    TRIGLYCERIDES 537 (H) <150 mg/dL      Comment:         If a  non-fasting specimen was collected, consider  repeat triglyceride testing on a fasting specimen  if clinically indicated.   Miracle et al. J. of Clin. Lipidol. 2015;9:129-169.        There is increased risk of pancreatitis when the   triglyceride concentration is very high   (> or = 500 mg/dL, especially if > or = 1000 mg/dL).   Miracle et al. J. of Clin. Lipidol. 2015;9:129-169.         LDL-CHOLESTEROL        Comment:         LDL cholesterol not calculated. Triglyceride levels  greater than 400 mg/dL invalidate calculated LDL results.           Reference range: <100     Desirable range <100 mg/dL for primary prevention;    <70 mg/dL for patients with CHD or diabetic patients   with > or = 2 CHD risk factors.     LDL-C is now calculated using the Alistair   calculation, which is a validated novel method providing   better accuracy than the Friedewald equation in the   estimation of LDL-C.   Yimi SCHWARZ et al. TRANG. 2013;310(19): 4399-7922   (http://education.[employer]/faq/CTC430)      CHOL/HDLC RATIO 3.9 <5.0 (calc)    NON HDL CHOLESTEROL 132 (H) <130 mg/dL (calc)      Comment:      For patients with diabetes plus 1 major ASCVD risk   factor, treating to a non-HDL-C goal of <100 mg/dL   (LDL-C of <70 mg/dL) is considered a therapeutic   option.     Hemoglobin A1C   Result Value Ref Range    HEMOGLOBIN A1c 7.8 (H) <5.7 %      Comment:      For someone without known diabetes, a hemoglobin A1c  value of 6.5% or greater indicates that they may have   diabetes and this should be confirmed with a follow-up   test.     For someone with known diabetes, a value <7% indicates   that their diabetes is well controlled and a value   greater than or equal to 7% indicates suboptimal   control. A1c targets should be individualized based on   duration of diabetes, age, comorbid conditions, and   other considerations.     Currently, no consensus exists regarding use of  hemoglobin A1c for diagnosis of diabetes for  children.          eAG (mg/dL) 177 mg/dL    eAG (mmol/L) 9.8 mmol/L       1:23 PM      Results were successfully communicated with the patient per Dr. Lorenzo  and they acknowledged their understanding.

## 2025-04-23 ENCOUNTER — APPOINTMENT (OUTPATIENT)
Dept: PRIMARY CARE | Facility: CLINIC | Age: 60
End: 2025-04-23
Payer: COMMERCIAL

## 2025-04-23 VITALS
OXYGEN SATURATION: 97 % | SYSTOLIC BLOOD PRESSURE: 160 MMHG | HEART RATE: 97 BPM | WEIGHT: 237 LBS | HEIGHT: 72 IN | DIASTOLIC BLOOD PRESSURE: 110 MMHG | BODY MASS INDEX: 32.1 KG/M2

## 2025-04-23 DIAGNOSIS — K21.9 GASTROESOPHAGEAL REFLUX DISEASE WITHOUT ESOPHAGITIS: ICD-10-CM

## 2025-04-23 DIAGNOSIS — E11.9 DIABETES MELLITUS TYPE 2 WITHOUT RETINOPATHY (MULTI): ICD-10-CM

## 2025-04-23 DIAGNOSIS — E03.9 ACQUIRED HYPOTHYROIDISM: ICD-10-CM

## 2025-04-23 DIAGNOSIS — F33.9 RECURRENT MAJOR DEPRESSIVE DISORDER, REMISSION STATUS UNSPECIFIED: ICD-10-CM

## 2025-04-23 DIAGNOSIS — F10.20 UNCOMPLICATED ALCOHOL DEPENDENCE (MULTI): ICD-10-CM

## 2025-04-23 DIAGNOSIS — L40.9 PSORIASIS: ICD-10-CM

## 2025-04-23 DIAGNOSIS — I25.118 CORONARY ARTERY DISEASE OF NATIVE ARTERY OF NATIVE HEART WITH STABLE ANGINA PECTORIS: Primary | ICD-10-CM

## 2025-04-23 DIAGNOSIS — E78.5 DYSLIPIDEMIA: ICD-10-CM

## 2025-04-23 DIAGNOSIS — F43.21 GRIEVING: ICD-10-CM

## 2025-04-23 DIAGNOSIS — I10 HYPERTENSION, UNSPECIFIED TYPE: ICD-10-CM

## 2025-04-23 DIAGNOSIS — F32.A DEPRESSION, UNSPECIFIED DEPRESSION TYPE: ICD-10-CM

## 2025-04-23 PROCEDURE — 3008F BODY MASS INDEX DOCD: CPT | Performed by: INTERNAL MEDICINE

## 2025-04-23 PROCEDURE — 99214 OFFICE O/P EST MOD 30 MIN: CPT | Performed by: INTERNAL MEDICINE

## 2025-04-23 PROCEDURE — 3080F DIAST BP >= 90 MM HG: CPT | Performed by: INTERNAL MEDICINE

## 2025-04-23 PROCEDURE — 3060F POS MICROALBUMINURIA REV: CPT | Performed by: INTERNAL MEDICINE

## 2025-04-23 PROCEDURE — 3077F SYST BP >= 140 MM HG: CPT | Performed by: INTERNAL MEDICINE

## 2025-04-23 PROCEDURE — 4010F ACE/ARB THERAPY RXD/TAKEN: CPT | Performed by: INTERNAL MEDICINE

## 2025-04-23 RX ORDER — EPLERENONE 25 MG/1
25 TABLET ORAL DAILY
Qty: 30 TABLET | Refills: 11 | Status: SHIPPED | OUTPATIENT
Start: 2025-04-23 | End: 2026-04-23

## 2025-04-23 RX ORDER — FENOFIBRATE 145 MG/1
145 TABLET, FILM COATED ORAL DAILY
Qty: 30 TABLET | Refills: 5 | Status: SHIPPED | OUTPATIENT
Start: 2025-04-23 | End: 2025-10-20

## 2025-04-23 RX ORDER — FLUOXETINE HYDROCHLORIDE 20 MG/1
20 CAPSULE ORAL DAILY
Qty: 30 CAPSULE | Refills: 1 | Status: SHIPPED | OUTPATIENT
Start: 2025-04-23 | End: 2025-06-22

## 2025-04-23 RX ORDER — PANTOPRAZOLE SODIUM 40 MG/1
40 TABLET, DELAYED RELEASE ORAL DAILY
Qty: 30 TABLET | Refills: 1 | Status: SHIPPED | OUTPATIENT
Start: 2025-04-23 | End: 2025-06-22

## 2025-04-23 RX ORDER — LANOLIN ALCOHOL/MO/W.PET/CERES
100 CREAM (GRAM) TOPICAL DAILY
Qty: 30 TABLET | Refills: 11 | Status: SHIPPED | OUTPATIENT
Start: 2025-04-23 | End: 2026-04-23

## 2025-04-23 RX ORDER — ALPRAZOLAM 0.25 MG/1
0.25 TABLET ORAL 3 TIMES DAILY PRN
Qty: 9 TABLET | Refills: 0 | Status: SHIPPED | OUTPATIENT
Start: 2025-04-23 | End: 2025-04-30

## 2025-04-23 ASSESSMENT — PATIENT HEALTH QUESTIONNAIRE - PHQ9
4. FEELING TIRED OR HAVING LITTLE ENERGY: NEARLY EVERY DAY
6. FEELING BAD ABOUT YOURSELF - OR THAT YOU ARE A FAILURE OR HAVE LET YOURSELF OR YOUR FAMILY DOWN: NEARLY EVERY DAY
8. MOVING OR SPEAKING SO SLOWLY THAT OTHER PEOPLE COULD HAVE NOTICED. OR THE OPPOSITE, BEING SO FIGETY OR RESTLESS THAT YOU HAVE BEEN MOVING AROUND A LOT MORE THAN USUAL: NEARLY EVERY DAY
1. LITTLE INTEREST OR PLEASURE IN DOING THINGS: NEARLY EVERY DAY
10. IF YOU CHECKED OFF ANY PROBLEMS, HOW DIFFICULT HAVE THESE PROBLEMS MADE IT FOR YOU TO DO YOUR WORK, TAKE CARE OF THINGS AT HOME, OR GET ALONG WITH OTHER PEOPLE: VERY DIFFICULT
1. LITTLE INTEREST OR PLEASURE IN DOING THINGS: SEVERAL DAYS
SUM OF ALL RESPONSES TO PHQ9 QUESTIONS 1 AND 2: 0
9. THOUGHTS THAT YOU WOULD BE BETTER OFF DEAD, OR OF HURTING YOURSELF: NOT AT ALL
2. FEELING DOWN, DEPRESSED OR HOPELESS: NEARLY EVERY DAY
SUM OF ALL RESPONSES TO PHQ QUESTIONS 1-9: 24
SUM OF ALL RESPONSES TO PHQ9 QUESTIONS 1 AND 2: 6
SUM OF ALL RESPONSES TO PHQ9 QUESTIONS 1 AND 2: 2
2. FEELING DOWN, DEPRESSED OR HOPELESS: NOT AT ALL
3. TROUBLE FALLING OR STAYING ASLEEP OR SLEEPING TOO MUCH: NEARLY EVERY DAY
7. TROUBLE CONCENTRATING ON THINGS, SUCH AS READING THE NEWSPAPER OR WATCHING TELEVISION: NEARLY EVERY DAY
10. IF YOU CHECKED OFF ANY PROBLEMS, HOW DIFFICULT HAVE THESE PROBLEMS MADE IT FOR YOU TO DO YOUR WORK, TAKE CARE OF THINGS AT HOME, OR GET ALONG WITH OTHER PEOPLE: EXTREMELY DIFFICULT
1. LITTLE INTEREST OR PLEASURE IN DOING THINGS: NOT AT ALL
5. POOR APPETITE OR OVEREATING: NEARLY EVERY DAY
2. FEELING DOWN, DEPRESSED OR HOPELESS: SEVERAL DAYS

## 2025-04-23 ASSESSMENT — ENCOUNTER SYMPTOMS
OCCASIONAL FEELINGS OF UNSTEADINESS: 0
DEPRESSION: 0
LOSS OF SENSATION IN FEET: 0

## 2025-04-23 NOTE — ASSESSMENT & PLAN NOTE
Patient is started on Prozac regarding depression and will be referred to clinical psychologist and psychiatrist for further management.  He is encouraged to cut down on drinking alcohol and marijuana use.  He is also given empathy regarding his recent loss.

## 2025-04-23 NOTE — PROGRESS NOTES
Subjective   Patient ID: Maurice Sterling is a 59 y.o. male who presents for New Patient Visit (Patient has seen you before in [facility] 2[facility]3 years ago. ) and Depression (Patient is very depressed, lost his girlfriend to suicide. ).    HPI patient presents to clinic complaining of feeling depressed since his girlfriend committed suicide few months ago.  He is feeling quite agitated, restless, having insomnia and persistent sobbing for the past 1 month.  He has been drinking a lot in order to  overcome his depression.  He denies any suicidal ideation.  He is also experiencing some heartburn due to his alcohol ingestion.  He scored 24 out of 27 on PHQ-9 scale.  He is also here for follow-up visit on history of psoriasis,htn,obesity,psoriasis,sciatica,bipolar disorder, alcohol dependence, obesity, coronary artery disease with stent insertion, type 2 diabetes, dyslipidemia, chronic back pain, hypothyroidism, severe hepatic steatosis, thrombocytopenia and anxiety disorder.  Laboratory studies done showed hemoglobin A1c of 7.8%, triglycerides of 537, serum glucose of 207 and other studies are unremarkable.  He denies any epigastric pain, nausea vomiting GI bleeding and chest pain.  24/27 on PHQ9 scale       Review of Systems   Constitutional: Negative.    HENT: Negative.     Eyes: Negative.    Respiratory: Negative.     Cardiovascular: Negative.    Gastrointestinal: Negative.    Endocrine: Negative.    Genitourinary: Negative.    Musculoskeletal: Negative.    Skin: Negative.    Allergic/Immunologic: Negative.    Neurological: Negative.    Hematological: Negative.    Psychiatric/Behavioral:  Positive for sleep disturbance. The patient is nervous/anxious.        Objective   BP (!) 160/110 (BP Location: Right arm, Patient Position: Sitting)   Pulse 97   Ht 1.829 m (6')   Wt 108 kg (237 lb)   SpO2 97%   BMI 32.14 kg/m²     Physical Exam  Constitutional:       Appearance: Normal appearance. He is obese.   HENT:      Right Ear:  Tympanic membrane normal.      Left Ear: Tympanic membrane and ear canal normal.      Nose: Nose normal.   Neck:      Vascular: No carotid bruit.   Cardiovascular:      Rate and Rhythm: Normal rate.   Pulmonary:      Effort: No respiratory distress.      Breath sounds: No stridor. No wheezing.   Abdominal:      Palpations: Abdomen is soft.      Tenderness: There is no abdominal tenderness. There is no guarding or rebound.   Skin:     Coloration: Skin is not jaundiced.      Findings: No bruising.   Neurological:      General: No focal deficit present.      Mental Status: He is alert and oriented to person, place, and time.   Psychiatric:         Mood and Affect: Mood normal.         Assessment/Plan   Assessment & Plan  Recurrent major depressive disorder, remission status unspecified  Patient is started on Prozac regarding depression and will be referred to clinical psychologist and psychiatrist for further management.  He is encouraged to cut down on drinking alcohol and marijuana use.  He is also given empathy regarding his recent loss.       Diabetes mellitus type 2 without retinopathy (Multi)  He is advised to continue Trulicity, Farxiga and glimepiride regarding history of type 2 diabetes.  Orders:    eplerenone (Inspra) 25 mg tablet; Take 1 tablet (25 mg) by mouth once daily.    Urinalysis with Reflex Microscopic; Future    Coronary artery disease of native artery of native heart with stable angina pectoris  He will continue aspirin, Plavix and metoprolol regarding history of coronary artery disease.  He will also continue to follow-up with cardiology clinic.       Acquired hypothyroidism  He is advised to continue levothyroxine and will monitor TSH.  Orders:    TSH; Future    Psoriasis  He has not had any recent flare of his psoriasis.       Depression, unspecified depression type  He is advised to continue fluoxetine and he is encouraged to follow-up with psychiatry and psychologist.  Orders:    Referral to  Psychiatry; Future    Referral to Psychology; Future    FLUoxetine (PROzac) 20 mg capsule; Take 1 capsule (20 mg) by mouth once daily.    Grieving  He is given Xanax regarding grieving.  Orders:    ALPRAZolam (Xanax) 0.25 mg tablet; Take 1 tablet (0.25 mg) by mouth 3 times a day as needed for anxiety for up to 3 days.    Dyslipidemia  He will be started on fenofibrate regarding dyslipidemia.  Orders:    fenofibrate (Tricor) 145 mg tablet; Take 1 tablet (145 mg) by mouth once daily.    Uncomplicated alcohol dependence (Multi)  He is encouraged to cut down on drinking alcohol and is started on thiamine 100 mg daily.  Orders:    Gamma GT; Future    Magnesium; Future    pantoprazole (ProtoNix) 40 mg EC tablet; Take 1 tablet (40 mg) by mouth once daily. Do not crush, chew, or split.    thiamine (Vitamin B-1) 100 mg tablet; Take 1 tablet (100 mg) by mouth once daily.    Gastroesophageal reflux disease without esophagitis  He is started on pantoprazole regarding his symptoms of GERD.  Orders:    pantoprazole (ProtoNix) 40 mg EC tablet; Take 1 tablet (40 mg) by mouth once daily. Do not crush, chew, or split.    Hypertension, unspecified type  His blood pressure is elevated due to increased anxiety and is encouraged to continue taking hydralazine, lisinopril and metoprolol.  He is strongly advised to return to clinic in 1 week for follow-up visit.

## 2025-04-23 NOTE — ASSESSMENT & PLAN NOTE
He is advised to continue Trulicity, Farxiga and glimepiride regarding history of type 2 diabetes.  Orders:    eplerenone (Inspra) 25 mg tablet; Take 1 tablet (25 mg) by mouth once daily.    Urinalysis with Reflex Microscopic; Future

## 2025-04-23 NOTE — ASSESSMENT & PLAN NOTE
He will continue aspirin, Plavix and metoprolol regarding history of coronary artery disease.  He will also continue to follow-up with cardiology clinic.

## 2025-04-25 ENCOUNTER — PATIENT OUTREACH (OUTPATIENT)
Dept: CARE COORDINATION | Facility: CLINIC | Age: 60
End: 2025-04-25

## 2025-04-25 ENCOUNTER — APPOINTMENT (OUTPATIENT)
Dept: PHARMACY | Facility: HOSPITAL | Age: 60
End: 2025-04-25
Payer: COMMERCIAL

## 2025-04-25 DIAGNOSIS — I10 ESSENTIAL (PRIMARY) HYPERTENSION: ICD-10-CM

## 2025-04-25 DIAGNOSIS — R06.02 SHORT OF BREATH ON EXERTION: ICD-10-CM

## 2025-04-25 DIAGNOSIS — E11.9 DIABETES MELLITUS TYPE 2 WITHOUT RETINOPATHY (MULTI): ICD-10-CM

## 2025-04-25 NOTE — ASSESSMENT & PLAN NOTE
Patient's goal A1c is < 7%.  Is pt at goal? No; A1c elevated at 7.8%. Improved from previous elevated at 8.1% on 11/11/2024 and 8.4% on 11/7/2023    Patient's SMBGs are not available for interpretation. Advised patient to begin home glucose monitoring as previously discussed. He reports that he has all necessary supplies for glucose monitoring. He is eligible for coverage with a continuous glucose monitoring such as Freestyle Chase or Dexcom through his insurance.       Repeat laboratory studies completed on 4/21/2025 prior to new patient visit with primary care provider. During his visit he was initiated on fenofibrate 145 mg once daily in combination with rosuvastatin 20 mg once daily based on elevated triglycerides at 537 mg/dL. This is likely secondary to increased alcohol consumption in the past 3 months. If his triglycerides are unable to be controlled with this combination of therapy then consideration for the use of icosapent ethyl in setting of hypertriglyceridemia, type two diabetes mellitus and secondary prevention would be warranted. His LDL-c is unable to be calculated due to triglycerides >400 mg/dL. He has started therapy with dulaglutide 3 mg once weekly as prescribed at last visit. He has continued with glimepiride 4 mg twice daily and dapagliflozin 10 mg. He does not endorse any missed doses of therapy. Begin home glucose monitoring for review of current glycemic control.     Monitoring and Education:  Counseled patient on relevant medication mechanisms of action, expectations, side effects, duration of therapy, contraindications, administration, and monitoring parameters.  All questions and concerns addressed. Contact pharmacist with any further questions or concerns prior to next appointment.  Reviewed BG goals: Fasting BG goal , Postprandial BG goal <180 mg/dL, A1C goal <7%%  Reviewed BP goals: <130/<80

## 2025-04-25 NOTE — PROGRESS NOTES
Outreach to patient for follow up check in.   Basic needs are stable, no changes.   Discussed updates, PCP provided referral for psychologist.   Planned contact to psychologist in next few days.   Agreed to another check in post contact.   SHIRA Rivera  [facility]    Contact: [phone]

## 2025-04-25 NOTE — PROGRESS NOTES
"    Clinical Pharmacy Appointment    Patient ID: Maurice Sterling is a 59 y.o. male who presents for Diabetes.    Pt is here for Follow Up appointment.     Referring Provider: Isaias Lorenzo MD    Subjective     Maurice Sterling is a 59 y.o. year old male patient with type two diabetes mellitus, CKD G2/A2, coronary artery disease, class I obesity (BMI 31.46 kg/m2), polysubstance abuse, bipolar disorder and hx of papillary thyroid carcinoma referred for medication management. He completed a follow up visit with Dr. Lorenzo on 2025. He is currently prescribed glimepiride 4 mg twice daily, Trulicity 1.5 mg once weekly, and Farxiga 10 mg once daily in the setting of type two diabetes. He is prescribed lisinopril 40 mg once daily, Metoprolol succinate 100 mg once daily, eplerenone 25 mg daily and hydralazine 25 mg once daily in the setting of hypertension. He completed repeat laboratory studies on 2025. He completed a new primary care provider visit on 2025. His hemolobin A1c is above goal at 7.8%. He does evidence proteinuria. His triglycerides are uncontrolled, >500 mg/dL, and as such was initiated on fenofibrate 145 mg once daily.       DIABETES MELLITUS TYPE 2:    Does patient follow with Endocrinology: No  Does patient follow with Nephrology: No  Does pt have proteinuria? Yes    Tobacco Use: None  Alcohol: Mixed drinks (1-3 shots of vodka + orange juice, coffee + 1-3 shots of rum) or 3-4 cans of beer.   Illicit Drug Use: Marijuana - \"1/2 joint per day\"    Current diabetic medications include:  Glimepiride 4 mg: Take one tablet by mouth twice daily.  Trulicity 3 mg: Inject 3 mg under the skin once weekly on  (Doses remainin Last injection: 2; Next injection today 2025)  Farxiga 10 mg: Take one tablet by mouth once daily in the morning    Historical DM pharmacotherapy:  Metformin use - Unknown per patient  Xigduo Xr 10/1000 mg - Therapy previously prescribed    Clarifications to above regimen: "   Glimepiride 4 mg - Patient reports once daily administration    Current hypertension medications include:  Metoprolol succinate 100 mg: Take one tablet by mouth once daily  Lisinopril 40 mg: Take one tablet by mouth once daily   Eplerenone 25 mg: Take one tablet by mouth once daily  Hydralazine 25 mg: Take one tablet by mouth once daily     Clarifications to above regimen:   None    Current hyperlipidemia medications include:  Ezetimibe 10 mg: Take one tablet by mouth once daily   Rosuvastatin 20 mg: Take one tablet by mouth once daily   Fenofibrate 145 mg: Take one tablet by mouth once daily    Clarifications to above regimen:   Ezetimibe 10 mg: Take one tablet by mouth once daily - Not taking    Adverse Effects:   Nausea without vomiting or diarrhea. Denies constipation.   New onset acid reflux. Therapy started with pantoprazole after PCP visit.     Glucose Readings:  Glucometer/CGM Type: None  CGM monitoring supplies - Glucometer, test strips, lancets    Current home BG readings: None     Previous home BG readings: None    Any episodes of hypoglycemia? N/A.      Blood pressure Readings:  Monitor: Upper Arm Cuff  Monitoring Frequency: 1-2 times per day x 7 days  Timing of antihypertensive administration: 10 am     Blood pressure unavailable for interpretation     Secondary Prevention:  Statin? Yes  ACE-I/ARB? Yes  Aspirin? Yes    Pertinent PMH Review  PMH of Pancreatitis: No  PMH of Retinopathy: No  PMH of Recurrent Urinary Tract Infections: No   PMH of MTC/MEN type II: No    Medication Reconciliation:  Changed: None  Added: None  Discontinued: None    Drug Interactions  The following drug interactions were noted: Eplerenone + Lisinopril: Monitor potassium, enhanced hyperkalemic effect when agents are combined.    Medication System Management  Patient's preferred pharmacy: [facility]  Adherence/Organization:   AEMR Fill History Reviewed: yes  How often on an average per week does the patient forget to  take a dose of their medications?   Do you utilize auto-fill services? No  Do you use a pillbox at home?  yes - Once weekly  Is it weekly or monthly? Weekly   Do you prepare your own pillbox? Yes  Affordability/Accessibility:   No reported concerns with cost of medication therapy    Objective   Allergies[1]  Social History     Social History Narrative    Not on file      Medication Review  Current Outpatient Medications   Medication Instructions    ALPRAZolam (XANAX) 0.25 mg, oral, 3 times daily PRN    aspirin 81 mg EC tablet TAKE 1 TABLET BY MOUTH ONCE DAILY    blood sugar diagnostic (OneTouch Ultra Test) strip Use as directed twice daily for glucose monitoring. Test in the morning before breakfast and two hours after dinner.    blood-glucose meter misc Use as directed twice daily for glucose monitoring. Test in the morning before breakfast and two hours after dinner.    clopidogrel (PLAVIX) 75 mg, oral, Daily    eplerenone (INSPRA) 25 mg, oral, Daily    ezetimibe (ZETIA) 10 mg, oral, Daily    Farxiga 10 mg, oral, Daily    fenofibrate (TRICOR) 145 mg, oral, Daily    FLUoxetine (PROZAC) 20 mg, oral, Daily    glimepiride (Amaryl) 4 mg tablet Take 1 tablet by mouth in the morning and 1 tablet in the evening    hydrALAZINE (APRESOLINE) 25 mg, oral, 2 times daily    lancets (OneTouch Delica Plus Lancet) 33 gauge misc Use as directed twice daily for glucose monitoring. Test in the morning before breakfast and two hours after dinner.    levothyroxine (SYNTHROID, LEVOXYL) 175 mcg, Daily    lisinopril 40 mg, oral, Daily    metoprolol succinate XL (Toprol-XL) 100 mg 24 hr tablet Take one tablet by mouth once daily    nitroglycerin (Nitrostat) 0.4 mg SL tablet Place under the tongue.    pantoprazole (PROTONIX) 40 mg, oral, Daily, Do not crush, chew, or split.    rosuvastatin (CRESTOR) 20 mg, oral, Daily    thiamine (VITAMIN B-1) 100 mg, oral, Daily    Trulicity 3 mg, subcutaneous, Weekly      Vitals  BP Readings from Last 6  Encounters:   04/23/25 (!) 160/110   04/07/25 135/83   11/13/24 (!) 152/102   06/28/24 (!) 178/115   11/08/23 (!) 147/103   11/03/23 (!) 158/100     BMI Readings from Last 6 Encounters:   04/23/25 32.14 kg/m²   04/07/25 31.95 kg/m²   11/13/24 31.46 kg/m²   06/27/24 31.19 kg/m²   11/08/23 32.12 kg/m²   11/03/23 32.28 kg/m²      Labs  A1C  Lab Results   Component Value Date    HGBA1C 7.8 (H) 04/21/2025    HGBA1C 8.1 (H) 11/11/2024    HGBA1C 8.4 (H) 11/07/2023     BMP  Lab Results   Component Value Date    CALCIUM 9.5 04/21/2025     04/21/2025    K 4.4 04/21/2025    CO2 28 04/21/2025     04/21/2025    BUN 24 04/21/2025    CREATININE 1.13 04/21/2025    EGFR 74 04/21/2025     LFTs  Lab Results   Component Value Date    ALT 50 (H) 04/21/2025    AST 39 (H) 04/21/2025    ALKPHOS 49 04/21/2025    BILITOT 0.4 04/21/2025     FLP  Lab Results   Component Value Date    TRIG 537 (H) 04/21/2025    CHOL 177 04/21/2025    LDLF - 05/09/2023    LDLCALC  04/21/2025      Comment:         LDL cholesterol not calculated. Triglyceride levels  greater than 400 mg/dL invalidate calculated LDL results.           Reference range: <100     Desirable range <100 mg/dL for primary prevention;    <70 mg/dL for patients with CHD or diabetic patients   with > or = 2 CHD risk factors.     LDL-C is now calculated using the Alistair   calculation, which is a validated novel method providing   better accuracy than the Friedewald equation in the   estimation of LDL-C.   Yimi SCHWARZ et al. TRANG. 2013;310(19): 9085-2741   (http://education.[employer].Context Relevant/faq/ERX670)      HDL 45 04/21/2025     Urine Microalbumin  Lab Results   Component Value Date    MICROALBCREA 158 (H) 04/21/2025     Weight Management  Wt Readings from Last 3 Encounters:   04/23/25 108 kg (237 lb)   04/07/25 107 kg (235 lb 9.6 oz)   11/13/24 105 kg (232 lb)      There is no height or weight on file to calculate BMI.     Assessment/Plan   Problem List Items Addressed  This Visit       Diabetes mellitus type 2 without retinopathy (Multi)    Patient's goal A1c is < 7%.  Is pt at goal? No; A1c elevated at 7.8%. Improved from previous elevated at 8.1% on 11/11/2024 and 8.4% on 11/7/2023    Patient's SMBGs are not available for interpretation. Advised patient to begin home glucose monitoring as previously discussed. He reports that he has all necessary supplies for glucose monitoring. He is eligible for coverage with a continuous glucose monitoring such as Freestyle Chase or Dexcom through his insurance.       Repeat laboratory studies completed on 4/21/2025 prior to new patient visit with primary care provider. During his visit he was initiated on fenofibrate 145 mg once daily in combination with rosuvastatin 20 mg once daily based on elevated triglycerides at 537 mg/dL. This is likely secondary to increased alcohol consumption in the past 3 months. If his triglycerides are unable to be controlled with this combination of therapy then consideration for the use of icosapent ethyl in setting of hypertriglyceridemia, type two diabetes mellitus and secondary prevention would be warranted. His LDL-c is unable to be calculated due to triglycerides >400 mg/dL. He has started therapy with dulaglutide 3 mg once weekly as prescribed at last visit. He has continued with glimepiride 4 mg twice daily and dapagliflozin 10 mg. He does not endorse any missed doses of therapy. Begin home glucose monitoring for review of current glycemic control.     Monitoring and Education:  Counseled patient on relevant medication mechanisms of action, expectations, side effects, duration of therapy, contraindications, administration, and monitoring parameters.  All questions and concerns addressed. Contact pharmacist with any further questions or concerns prior to next appointment.  Reviewed BG goals: Fasting BG goal , Postprandial BG goal <180 mg/dL, A1C goal <7%%  Reviewed BP goals: <130/<80            Relevant Orders    Referral to Clinical Pharmacy     Other Visit Diagnoses         Short of breath on exertion          Essential (primary) hypertension        Relevant Orders    Referral to Clinical Pharmacy          Follow-up: I recommend diabetes care be 5/30/2025 at 1:20 pm.  PCP Visit: 4/30/2025  Nutrition appointment: 5/5/2025  Cardiology Follow-Up: 7/7/2025    Patient is not followed in CCM.    Continue all meds under the continuation of care with the referring provider and clinical pharmacy team.    Thank you,  Kendrick Meyers, PharmD    Clinical Pharmacist  [phone]    Verbal consent to manage patient's drug therapy was obtained from the patient. They were informed they may decline to participate or withdraw from participation in pharmacy services at any time.       [1]   Allergies  Allergen Reactions    Prednisone Anxiety

## 2025-04-29 ASSESSMENT — ENCOUNTER SYMPTOMS
RESPIRATORY NEGATIVE: 1
ENDOCRINE NEGATIVE: 1
MUSCULOSKELETAL NEGATIVE: 1
NEUROLOGICAL NEGATIVE: 1
ALLERGIC/IMMUNOLOGIC NEGATIVE: 1
SLEEP DISTURBANCE: 1
CONSTITUTIONAL NEGATIVE: 1
HEMATOLOGIC/LYMPHATIC NEGATIVE: 1
EYES NEGATIVE: 1
CARDIOVASCULAR NEGATIVE: 1
NERVOUS/ANXIOUS: 1
GASTROINTESTINAL NEGATIVE: 1

## 2025-04-30 ENCOUNTER — APPOINTMENT (OUTPATIENT)
Dept: PRIMARY CARE | Facility: CLINIC | Age: 60
End: 2025-04-30
Payer: COMMERCIAL

## 2025-04-30 VITALS
BODY MASS INDEX: 31.42 KG/M2 | WEIGHT: 232 LBS | HEART RATE: 85 BPM | OXYGEN SATURATION: 96 % | HEIGHT: 72 IN | SYSTOLIC BLOOD PRESSURE: 140 MMHG | DIASTOLIC BLOOD PRESSURE: 98 MMHG

## 2025-04-30 DIAGNOSIS — E03.9 ACQUIRED HYPOTHYROIDISM: ICD-10-CM

## 2025-04-30 DIAGNOSIS — F33.9 RECURRENT MAJOR DEPRESSIVE DISORDER, REMISSION STATUS UNSPECIFIED: ICD-10-CM

## 2025-04-30 DIAGNOSIS — I25.118 CORONARY ARTERY DISEASE OF NATIVE ARTERY OF NATIVE HEART WITH STABLE ANGINA PECTORIS: ICD-10-CM

## 2025-04-30 DIAGNOSIS — E11.9 DIABETES MELLITUS TYPE 2 WITHOUT RETINOPATHY (MULTI): Primary | ICD-10-CM

## 2025-04-30 DIAGNOSIS — F10.20 UNCOMPLICATED ALCOHOL DEPENDENCE (MULTI): ICD-10-CM

## 2025-04-30 DIAGNOSIS — F41.9 ANXIETY: ICD-10-CM

## 2025-04-30 PROCEDURE — 3008F BODY MASS INDEX DOCD: CPT | Performed by: INTERNAL MEDICINE

## 2025-04-30 PROCEDURE — 3080F DIAST BP >= 90 MM HG: CPT | Performed by: INTERNAL MEDICINE

## 2025-04-30 PROCEDURE — 3060F POS MICROALBUMINURIA REV: CPT | Performed by: INTERNAL MEDICINE

## 2025-04-30 PROCEDURE — 3077F SYST BP >= 140 MM HG: CPT | Performed by: INTERNAL MEDICINE

## 2025-04-30 PROCEDURE — 4010F ACE/ARB THERAPY RXD/TAKEN: CPT | Performed by: INTERNAL MEDICINE

## 2025-04-30 PROCEDURE — 99213 OFFICE O/P EST LOW 20 MIN: CPT | Performed by: INTERNAL MEDICINE

## 2025-04-30 RX ORDER — BUSPIRONE HYDROCHLORIDE 10 MG/1
10 TABLET ORAL
Qty: 60 TABLET | Refills: 1 | Status: SHIPPED | OUTPATIENT
Start: 2025-04-30 | End: 2026-04-30

## 2025-04-30 ASSESSMENT — PATIENT HEALTH QUESTIONNAIRE - PHQ9
1. LITTLE INTEREST OR PLEASURE IN DOING THINGS: NEARLY EVERY DAY
3. TROUBLE FALLING OR STAYING ASLEEP OR SLEEPING TOO MUCH: MORE THAN HALF THE DAYS
2. FEELING DOWN, DEPRESSED OR HOPELESS: NEARLY EVERY DAY
1. LITTLE INTEREST OR PLEASURE IN DOING THINGS: NOT AT ALL
SUM OF ALL RESPONSES TO PHQ9 QUESTIONS 1 AND 2: 0
7. TROUBLE CONCENTRATING ON THINGS, SUCH AS READING THE NEWSPAPER OR WATCHING TELEVISION: NEARLY EVERY DAY
5. POOR APPETITE OR OVEREATING: MORE THAN HALF THE DAYS
10. IF YOU CHECKED OFF ANY PROBLEMS, HOW DIFFICULT HAVE THESE PROBLEMS MADE IT FOR YOU TO DO YOUR WORK, TAKE CARE OF THINGS AT HOME, OR GET ALONG WITH OTHER PEOPLE: EXTREMELY DIFFICULT
8. MOVING OR SPEAKING SO SLOWLY THAT OTHER PEOPLE COULD HAVE NOTICED. OR THE OPPOSITE, BEING SO FIGETY OR RESTLESS THAT YOU HAVE BEEN MOVING AROUND A LOT MORE THAN USUAL: NOT AT ALL
SUM OF ALL RESPONSES TO PHQ QUESTIONS 1-9: 15
9. THOUGHTS THAT YOU WOULD BE BETTER OFF DEAD, OR OF HURTING YOURSELF: NOT AT ALL
4. FEELING TIRED OR HAVING LITTLE ENERGY: MORE THAN HALF THE DAYS
SUM OF ALL RESPONSES TO PHQ9 QUESTIONS 1 AND 2: 6
2. FEELING DOWN, DEPRESSED OR HOPELESS: NOT AT ALL
6. FEELING BAD ABOUT YOURSELF - OR THAT YOU ARE A FAILURE OR HAVE LET YOURSELF OR YOUR FAMILY DOWN: NOT AT ALL

## 2025-04-30 ASSESSMENT — ENCOUNTER SYMPTOMS
DEPRESSION: 0
OCCASIONAL FEELINGS OF UNSTEADINESS: 0
LOSS OF SENSATION IN FEET: 0

## 2025-04-30 NOTE — PROGRESS NOTES
Subjective   Patient ID: Maurice Sterling is a 59 y.o. male who presents for Follow-up.    HPI patient presents to clinic regarding follow-up on his depression and anxiety.  He continues to feel depressed and having insomnia.  He is awaiting to be seen by behavioral health regarding his depression and anxiety.  He forgot to get recent blood work done.  He denies any suicidal ideation, agitation, restlessness, nausea vomiting palpitation and diaphoresis.  He has history of psoriasis,htn,obesity,psoriasis,sciatica,bipolar disorder, alcohol dependence, obesity, coronary artery disease with stent insertion, type 2 diabetes, dyslipidemia, chronic back pain, hypothyroidism, severe hepatic steatosis, thrombocytopenia and anxiety disorder.  Laboratory studies     Review of Systems   Constitutional: Negative.    HENT: Negative.     Eyes: Negative.    Respiratory: Negative.     Cardiovascular: Negative.    Gastrointestinal: Negative.    Endocrine: Negative.    Genitourinary: Negative.    Musculoskeletal: Negative.    Skin: Negative.    Allergic/Immunologic: Negative.    Neurological: Negative.    Hematological: Negative.    Psychiatric/Behavioral:  The patient is nervous/anxious.        Objective   BP (!) 140/98 (BP Location: Right arm, Patient Position: Sitting)   Pulse 85   Ht 1.829 m (6')   Wt 105 kg (232 lb)   SpO2 96%   BMI 31.46 kg/m²     Physical Exam  Constitutional:       Appearance: Normal appearance. He is obese.   HENT:      Right Ear: Tympanic membrane normal.      Left Ear: Tympanic membrane and ear canal normal.      Nose: Nose normal.   Neck:      Vascular: No carotid bruit.   Cardiovascular:      Rate and Rhythm: Normal rate.   Pulmonary:      Effort: No respiratory distress.      Breath sounds: No stridor. No wheezing.   Abdominal:      Palpations: Abdomen is soft.      Tenderness: There is no abdominal tenderness. There is no guarding or rebound.   Skin:     Coloration: Skin is not jaundiced.       Findings: No bruising.   Neurological:      General: No focal deficit present.      Mental Status: He is alert and oriented to person, place, and time.   Psychiatric:         Mood and Affect: Mood normal.         Assessment/Plan   Assessment & Plan  Anxiety  Patient is  strongly advised to follow-up with behavioral health.  He is started on buspirone 10 mg twice daily.  He is strongly advised to obtain blood work and to return to clinic in 1 month for follow-up visit.  Orders:    busPIRone (Buspar) 10 mg tablet; Take 1 tablet (10 mg) by mouth 2 times a day before meals.    Diabetes mellitus type 2 without retinopathy (Multi)  He will continue glimepiride, Farxiga and Trulicity regarding history of type 2 diabetes.  Speaking  Orders:    Hemoglobin A1c; Future    Basic metabolic panel; Future    Recurrent major depressive disorder, remission status unspecified  He will continue Prozac regarding depression.  Orders:    Follow Up In Primary Care - Established; Future    Uncomplicated alcohol dependence (Multi)  He is encouraged to cut down on drinking alcohol.  Orders:    Hepatic function panel; Future    Gamma GT; Future    Magnesium; Future    Acquired hypothyroidism  He will continue levothyroxine and will monitor TSH.  Orders:    TSH; Future    Coronary artery disease of native artery of native heart with stable angina pectoris  He is advised to continue metoprolol, Plavix, Inspra and rosuvastatin regarding history of coronary artery disease.  He will also continue to follow-up with cardiology clinic.

## 2025-05-03 ASSESSMENT — ENCOUNTER SYMPTOMS
CARDIOVASCULAR NEGATIVE: 1
EYES NEGATIVE: 1
CONSTITUTIONAL NEGATIVE: 1
ALLERGIC/IMMUNOLOGIC NEGATIVE: 1
MUSCULOSKELETAL NEGATIVE: 1
ENDOCRINE NEGATIVE: 1
HEMATOLOGIC/LYMPHATIC NEGATIVE: 1
NEUROLOGICAL NEGATIVE: 1
GASTROINTESTINAL NEGATIVE: 1
NERVOUS/ANXIOUS: 1
RESPIRATORY NEGATIVE: 1

## 2025-05-03 NOTE — ASSESSMENT & PLAN NOTE
He is advised to continue metoprolol, Plavix, Inspra and rosuvastatin regarding history of coronary artery disease.  He will also continue to follow-up with cardiology clinic.

## 2025-05-03 NOTE — ASSESSMENT & PLAN NOTE
He will continue Prozac regarding depression.  Orders:    Follow Up In Primary Care - Established; Future

## 2025-05-03 NOTE — ASSESSMENT & PLAN NOTE
He will continue glimepiride, Farxiga and Trulicity regarding history of type 2 diabetes.  Speaking  Orders:    Hemoglobin A1c; Future    Basic metabolic panel; Future

## 2025-05-03 NOTE — ASSESSMENT & PLAN NOTE
Patient is  strongly advised to follow-up with behavioral health.  He is started on buspirone 10 mg twice daily.  He is strongly advised to obtain blood work and to return to clinic in 1 month for follow-up visit.  Orders:    busPIRone (Buspar) 10 mg tablet; Take 1 tablet (10 mg) by mouth 2 times a day before meals.

## 2025-05-05 ENCOUNTER — APPOINTMENT (OUTPATIENT)
Facility: HOSPITAL | Age: 60
End: 2025-05-05
Payer: COMMERCIAL

## 2025-05-13 ENCOUNTER — CLINICAL SUPPORT (OUTPATIENT)
Dept: NUTRITION | Facility: HOSPITAL | Age: 60
End: 2025-05-13
Payer: COMMERCIAL

## 2025-05-13 ENCOUNTER — PATIENT OUTREACH (OUTPATIENT)
Dept: CARE COORDINATION | Facility: CLINIC | Age: 60
End: 2025-05-13

## 2025-05-13 NOTE — PROGRESS NOTES
Final care management outreach to patient.   M with contact information.   SHIRA Rivera  [facility]    Contact: [phone]

## 2025-05-13 NOTE — PROGRESS NOTES
Food For Life  Diet Recommendation 1: Healthy Eating  Diet Recommendation 2: Diabetes  Diet Recommendation 3: Healthy Eating  Food Intolerance Avoidance: NKFA  Household Size: 1 Family Member  Interventions: Referral Number: 2nd 6 Mo Referral 1 yr (Referrals may not be consecutive)  Interventions: Visit Number: 4 of 6 Visits - Max 6 Visits/Referral Each 6 Mo Period  Education Today: MyPlate Meals  Grains: 25-50% Whole  Fruit: Fresh - 100%  Vegetables: Fresh - 100%  Proteins: 0 Plant-based Items  Dairy: 25-50% Lowfat  Originating Site of Referral Order: [facility] [place]--[facility]  Initials of RD Assisting Today: SERGIO

## 2025-05-30 ENCOUNTER — APPOINTMENT (OUTPATIENT)
Dept: PHARMACY | Facility: HOSPITAL | Age: 60
End: 2025-05-30
Payer: COMMERCIAL

## 2025-05-30 ENCOUNTER — TELEPHONE (OUTPATIENT)
Dept: PHARMACY | Facility: HOSPITAL | Age: 60
End: 2025-05-30

## 2025-05-30 DIAGNOSIS — E11.9 DIABETES MELLITUS TYPE 2 WITHOUT RETINOPATHY (MULTI): Primary | ICD-10-CM

## 2025-05-30 DIAGNOSIS — E03.9 ACQUIRED HYPOTHYROIDISM: ICD-10-CM

## 2025-05-30 DIAGNOSIS — E11.10 DM (DIABETES MELLITUS) TYPE 2, UNCONTROLLED, WITH KETOACIDOSIS: ICD-10-CM

## 2025-05-30 DIAGNOSIS — F10.20 UNCOMPLICATED ALCOHOL DEPENDENCE (MULTI): ICD-10-CM

## 2025-05-30 DIAGNOSIS — E11.9 DIABETES MELLITUS TYPE 2 WITHOUT RETINOPATHY (MULTI): ICD-10-CM

## 2025-05-30 PROCEDURE — RXMED WILLOW AMBULATORY MEDICATION CHARGE

## 2025-05-30 RX ORDER — DAPAGLIFLOZIN 10 MG/1
10 TABLET, FILM COATED ORAL DAILY
Qty: 90 TABLET | Refills: 2 | Status: SHIPPED | OUTPATIENT
Start: 2025-05-30 | End: 2026-05-30

## 2025-05-30 NOTE — TELEPHONE ENCOUNTER
Patient unavailable for telehealth visit on 5/30/2025. Briefly discussed if he needed any prescription refills. An updated refill was sent to  [facility] to pickup to resume therapy with Trulicity 3 mg as his injections are on Fridays and he is out of therapy. He stated that he needed a refill on clopidogrel, advised that refills are at [facility].     Appointment rescheduled.

## 2025-06-02 ENCOUNTER — PHARMACY VISIT (OUTPATIENT)
Dept: PHARMACY | Facility: CLINIC | Age: 60
End: 2025-06-02
Payer: MEDICAID

## 2025-06-07 LAB
ALBUMIN SERPL-MCNC: 4.4 G/DL (ref 3.6–5.1)
ALBUMIN/GLOB SERPL: 1.8 (CALC) (ref 1–2.5)
ALP SERPL-CCNC: 53 U/L (ref 35–144)
ALT SERPL-CCNC: 49 U/L (ref 9–46)
ANION GAP SERPL CALCULATED.4IONS-SCNC: 11 MMOL/L (CALC) (ref 7–17)
AST SERPL-CCNC: 37 U/L (ref 10–35)
BILIRUB DIRECT SERPL-MCNC: 0.1 MG/DL
BILIRUB INDIRECT SERPL-MCNC: 0.5 MG/DL (CALC) (ref 0.2–1.2)
BILIRUB SERPL-MCNC: 0.6 MG/DL (ref 0.2–1.2)
BUN SERPL-MCNC: 21 MG/DL (ref 7–25)
BUN/CREAT SERPL: ABNORMAL (CALC) (ref 6–22)
CALCIUM SERPL-MCNC: 9.1 MG/DL (ref 8.6–10.3)
CHLORIDE SERPL-SCNC: 104 MMOL/L (ref 98–110)
CO2 SERPL-SCNC: 25 MMOL/L (ref 20–32)
CREAT SERPL-MCNC: 1.02 MG/DL (ref 0.7–1.35)
EGFRCR SERPLBLD CKD-EPI 2021: 84 ML/MIN/1.73M2
EST. AVERAGE GLUCOSE BLD GHB EST-MCNC: 166 MG/DL
EST. AVERAGE GLUCOSE BLD GHB EST-SCNC: 9.2 MMOL/L
GGT SERPL-CCNC: 49 U/L (ref 3–70)
GLOBULIN SER CALC-MCNC: 2.4 G/DL (CALC) (ref 1.9–3.7)
GLUCOSE SERPL-MCNC: 232 MG/DL (ref 65–99)
HBA1C MFR BLD: 7.4 %
MAGNESIUM SERPL-MCNC: 1.8 MG/DL (ref 1.5–2.5)
POTASSIUM SERPL-SCNC: 4.1 MMOL/L (ref 3.5–5.3)
PROT SERPL-MCNC: 6.8 G/DL (ref 6.1–8.1)
SODIUM SERPL-SCNC: 140 MMOL/L (ref 135–146)
TSH SERPL-ACNC: 0.06 MIU/L (ref 0.4–4.5)

## 2025-06-10 ENCOUNTER — CLINICAL SUPPORT (OUTPATIENT)
Dept: NUTRITION | Facility: HOSPITAL | Age: 60
End: 2025-06-10
Payer: COMMERCIAL

## 2025-06-10 NOTE — PROGRESS NOTES
Food For Life  Diet Recommendation 1: Healthy Eating  Diet Recommendation 2: Diabetes  Diet Recommendation 3: MyPlate  Food Intolerance Avoidance: NKFA  Household Size: 1 Family Member  Interventions: Referral Number: 2nd 6 Mo Referral 1 yr (Referrals may not be consecutive)  Interventions: Visit Number: 5 of 6 Visits - Max 6 Visits/Referral Each 6 Mo Period  Education Today: MyPlate Meals  Grains: 0-25% Whole  Fruit: % Fresh  Vegetables: Fresh - 100%  Proteins: 1-2 Plant-based Items  Dairy: 25-50% Lowfat  Originating Site of Referral Order: Dr. Isaias Lorenzo  Initials of RD Assisting Today: HH

## 2025-06-11 ENCOUNTER — APPOINTMENT (OUTPATIENT)
Dept: PRIMARY CARE | Facility: CLINIC | Age: 60
End: 2025-06-11
Payer: COMMERCIAL

## 2025-06-11 VITALS
OXYGEN SATURATION: 94 % | BODY MASS INDEX: 30.88 KG/M2 | DIASTOLIC BLOOD PRESSURE: 100 MMHG | HEIGHT: 72 IN | SYSTOLIC BLOOD PRESSURE: 150 MMHG | HEART RATE: 66 BPM | WEIGHT: 228 LBS

## 2025-06-11 DIAGNOSIS — E03.9 ACQUIRED HYPOTHYROIDISM: ICD-10-CM

## 2025-06-11 DIAGNOSIS — F43.21 GRIEVING: Primary | ICD-10-CM

## 2025-06-11 DIAGNOSIS — F33.9 RECURRENT MAJOR DEPRESSIVE DISORDER, REMISSION STATUS UNSPECIFIED: ICD-10-CM

## 2025-06-11 DIAGNOSIS — M54.2 NECK PAIN, CHRONIC: ICD-10-CM

## 2025-06-11 DIAGNOSIS — I25.10 ATHEROSCLEROTIC HEART DISEASE OF NATIVE CORONARY ARTERY WITHOUT ANGINA PECTORIS: ICD-10-CM

## 2025-06-11 DIAGNOSIS — E11.49 TYPE 2 DIABETES MELLITUS WITH OTHER DIABETIC NEUROLOGICAL COMPLICATION: ICD-10-CM

## 2025-06-11 DIAGNOSIS — E11.9 DIABETES MELLITUS TYPE 2 WITHOUT RETINOPATHY (MULTI): ICD-10-CM

## 2025-06-11 DIAGNOSIS — I10 ESSENTIAL (PRIMARY) HYPERTENSION: ICD-10-CM

## 2025-06-11 DIAGNOSIS — G89.29 NECK PAIN, CHRONIC: ICD-10-CM

## 2025-06-11 DIAGNOSIS — R06.02 SHORT OF BREATH ON EXERTION: ICD-10-CM

## 2025-06-11 DIAGNOSIS — E11.10 DM (DIABETES MELLITUS) TYPE 2, UNCONTROLLED, WITH KETOACIDOSIS: ICD-10-CM

## 2025-06-11 PROCEDURE — 4010F ACE/ARB THERAPY RXD/TAKEN: CPT | Performed by: INTERNAL MEDICINE

## 2025-06-11 PROCEDURE — 99214 OFFICE O/P EST MOD 30 MIN: CPT | Performed by: INTERNAL MEDICINE

## 2025-06-11 PROCEDURE — 3077F SYST BP >= 140 MM HG: CPT | Performed by: INTERNAL MEDICINE

## 2025-06-11 PROCEDURE — 3080F DIAST BP >= 90 MM HG: CPT | Performed by: INTERNAL MEDICINE

## 2025-06-11 PROCEDURE — 3060F POS MICROALBUMINURIA REV: CPT | Performed by: INTERNAL MEDICINE

## 2025-06-11 PROCEDURE — 3008F BODY MASS INDEX DOCD: CPT | Performed by: INTERNAL MEDICINE

## 2025-06-11 RX ORDER — CLOPIDOGREL BISULFATE 75 MG/1
75 TABLET ORAL DAILY
Qty: 90 TABLET | Refills: 3 | Status: SHIPPED | OUTPATIENT
Start: 2025-06-11

## 2025-06-11 RX ORDER — ROSUVASTATIN CALCIUM 20 MG/1
20 TABLET, COATED ORAL DAILY
Qty: 90 TABLET | Refills: 3 | Status: SHIPPED | OUTPATIENT
Start: 2025-06-11 | End: 2026-06-11

## 2025-06-11 RX ORDER — LISINOPRIL 40 MG/1
40 TABLET ORAL DAILY
Qty: 90 TABLET | Refills: 3 | Status: SHIPPED | OUTPATIENT
Start: 2025-06-11 | End: 2026-06-06

## 2025-06-11 RX ORDER — DAPAGLIFLOZIN 10 MG/1
10 TABLET, FILM COATED ORAL DAILY
Qty: 90 TABLET | Refills: 2 | Status: SHIPPED | OUTPATIENT
Start: 2025-06-11 | End: 2026-06-11

## 2025-06-11 RX ORDER — TIZANIDINE 2 MG/1
2 TABLET ORAL 2 TIMES DAILY PRN
Qty: 60 TABLET | Refills: 1 | Status: SHIPPED | OUTPATIENT
Start: 2025-06-11 | End: 2025-08-10

## 2025-06-11 RX ORDER — LEVOTHYROXINE SODIUM 150 UG/1
150 TABLET ORAL DAILY
Qty: 90 TABLET | Refills: 1 | Status: SHIPPED | OUTPATIENT
Start: 2025-06-11

## 2025-06-11 RX ORDER — VILAZODONE HYDROCHLORIDE 10 MG/1
10 TABLET ORAL DAILY
Qty: 30 TABLET | Refills: 2 | Status: SHIPPED | OUTPATIENT
Start: 2025-06-11 | End: 2026-06-11

## 2025-06-11 RX ORDER — HYDRALAZINE HYDROCHLORIDE 50 MG/1
50 TABLET, FILM COATED ORAL 2 TIMES DAILY
Qty: 180 TABLET | Refills: 1 | Status: SHIPPED | OUTPATIENT
Start: 2025-06-11 | End: 2026-06-11

## 2025-06-11 RX ORDER — GLIMEPIRIDE 4 MG/1
4 TABLET ORAL 2 TIMES DAILY
Qty: 180 TABLET | Refills: 1 | Status: SHIPPED | OUTPATIENT
Start: 2025-06-11

## 2025-06-11 RX ORDER — METOPROLOL SUCCINATE 100 MG/1
TABLET, EXTENDED RELEASE ORAL
Qty: 90 TABLET | Refills: 0 | Status: SHIPPED | OUTPATIENT
Start: 2025-06-11

## 2025-06-11 RX ORDER — ALPRAZOLAM 0.25 MG/1
0.25 TABLET ORAL 3 TIMES DAILY PRN
Qty: 9 TABLET | Refills: 0 | Status: SHIPPED | OUTPATIENT
Start: 2025-06-11 | End: 2025-06-14

## 2025-06-11 RX ORDER — ASPIRIN 81 MG/1
81 TABLET ORAL DAILY
Qty: 90 TABLET | Refills: 3 | Status: SHIPPED | OUTPATIENT
Start: 2025-06-11

## 2025-06-11 ASSESSMENT — ENCOUNTER SYMPTOMS
OCCASIONAL FEELINGS OF UNSTEADINESS: 0
LOSS OF SENSATION IN FEET: 0
DEPRESSION: 0

## 2025-06-11 ASSESSMENT — PATIENT HEALTH QUESTIONNAIRE - PHQ9
2. FEELING DOWN, DEPRESSED OR HOPELESS: MORE THAN HALF THE DAYS
1. LITTLE INTEREST OR PLEASURE IN DOING THINGS: MORE THAN HALF THE DAYS
1. LITTLE INTEREST OR PLEASURE IN DOING THINGS: NOT AT ALL
SUM OF ALL RESPONSES TO PHQ9 QUESTIONS 1 AND 2: 0
2. FEELING DOWN, DEPRESSED OR HOPELESS: NOT AT ALL
SUM OF ALL RESPONSES TO PHQ9 QUESTIONS 1 AND 2: 4

## 2025-06-11 NOTE — ASSESSMENT & PLAN NOTE
He will continue glimepiride, Farxiga and Trulicity regarding history of type 2 diabetes he will be scheduled for repeat blood work and will return to clinic in 3 months for follow-up visit.  Orders:    dapagliflozin propanediol (Farxiga) 10 mg tablet; Take 1 tablet (10 mg) by mouth once daily.    glimepiride (Amaryl) 4 mg tablet; Take 1 tablet (4 mg) by mouth 2 times a day. Take 1 tablet by mouth in the morning and 1 tablet in the evening    Hemoglobin A1c; Future    CBC and Auto Differential; Future    Urinalysis with Reflex Microscopic; Future

## 2025-06-11 NOTE — PROGRESS NOTES
Subjective   Patient ID: Maurice Sterling is a 59 y.o. male who presents for Follow-up and Medication Problem (Patient states the medication you prescribed last visit gave him tremors. He could not identify which med so he stopped them all. ).    HPI patient presents to clinic for follow-up visit on history of  psoriasis,htn,obesity,psoriasis,sciatica,bipolar disorder, alcohol dependence, obesity, coronary artery disease with stent insertion, type 2 diabetes, dyslipidemia, chronic back pain, hypothyroidism, severe hepatic steatosis, thrombocytopenia and anxiety disorder.  He continues to have increased anxiety and has been feeling depressed he stopped taking buspirone and fluoxetine due to tremors and other side effect.  He has been crying a lot and has not seen his psychiatrist and psychologist.  He denies any suicidal ideation, agitation, restlessness, chest pain fever chills and palpitation.  Laboratory studies done shows hemoglobin A1c of 7.4%, serum glucose of 232, AST of 37, ALT of 49, TSH of 0.06 and other studies have been reviewed and discussed with him.    Review of Systems   Constitutional: Negative.    HENT: Negative.     Eyes: Negative.    Respiratory: Negative.     Cardiovascular: Negative.    Gastrointestinal: Negative.    Endocrine: Negative.    Genitourinary: Negative.    Musculoskeletal: Negative.    Skin: Negative.    Allergic/Immunologic: Negative.    Neurological: Negative.    Hematological: Negative.    Psychiatric/Behavioral:  Positive for sleep disturbance. The patient is nervous/anxious.        Objective   BP (!) 150/100 (BP Location: Right arm, Patient Position: Sitting)   Pulse 66   Ht 1.829 m (6')   Wt 103 kg (228 lb)   SpO2 94%   BMI 30.92 kg/m²     Physical Exam  Constitutional:       Appearance: Normal appearance. He is normal weight.   HENT:      Right Ear: Tympanic membrane normal.      Left Ear: Tympanic membrane and ear canal normal.      Nose: Nose normal.   Neck:      Vascular:  No carotid bruit.   Cardiovascular:      Rate and Rhythm: Normal rate.   Pulmonary:      Effort: No respiratory distress.      Breath sounds: No stridor. No wheezing.   Abdominal:      Palpations: Abdomen is soft.      Tenderness: There is no abdominal tenderness. There is no guarding or rebound.   Skin:     Coloration: Skin is not jaundiced.      Findings: No bruising.   Neurological:      General: No focal deficit present.      Mental Status: He is alert and oriented to person, place, and time.   Psychiatric:         Mood and Affect: Mood normal.         Assessment/Plan   Assessment & Plan  Recurrent major depressive disorder, remission status unspecified  Patient is strongly advised  to follow-up with behavioral health regarding his major depression and anxiety.  He is given trial with Viibryd regarding depression.  Orders:    Follow Up In Primary Care - Established    vilazodone (Viibryd) 10 mg tablet; Take 1 tablet (10 mg) by mouth once daily.    Atherosclerotic heart disease of native coronary artery without angina pectoris  He will continue aspirin and clopidogrel regarding history of coronary artery disease and will also follow-up with cardiology clinic.    Orders:    aspirin 81 mg EC tablet; Take 1 tablet (81 mg) by mouth once daily.    clopidogrel (Plavix) 75 mg tablet; Take 1 tablet (75 mg) by mouth once daily.    Essential (primary) hypertension  He is advised to increase hydralazine to 50 mg twice daily and to continue lisinopril and metoprolol regarding uncontrolled hypertension.  Orders:    lisinopril 40 mg tablet; Take 1 tablet (40 mg) by mouth once daily.    metoprolol succinate XL (Toprol-XL) 100 mg 24 hr tablet; Take one tablet by mouth once daily    Urinalysis with Reflex Microscopic; Future    DM (diabetes mellitus) type 2, uncontrolled, with ketoacidosis  He is advised to continue Trulicity and Farxiga regarding history of type 2 diabetes.  He is advised to return to clinic in 3 months for  follow-up visit.  Orders:    hydrALAZINE (Apresoline) 50 mg tablet; Take 1 tablet (50 mg) by mouth 2 times a day.    dapagliflozin propanediol (Farxiga) 10 mg tablet; Take 1 tablet (10 mg) by mouth once daily.    Type 2 diabetes mellitus with other diabetic neurological complication  As mentioned above  Orders:    rosuvastatin (Crestor) 20 mg tablet; Take 1 tablet (20 mg) by mouth once daily.    Hepatic function panel; Future    Basic metabolic panel; Future    Diabetes mellitus type 2 without retinopathy (Multi)  He will continue glimepiride, Farxiga and Trulicity regarding history of type 2 diabetes he will be scheduled for repeat blood work and will return to clinic in 3 months for follow-up visit.  Orders:    dapagliflozin propanediol (Farxiga) 10 mg tablet; Take 1 tablet (10 mg) by mouth once daily.    glimepiride (Amaryl) 4 mg tablet; Take 1 tablet (4 mg) by mouth 2 times a day. Take 1 tablet by mouth in the morning and 1 tablet in the evening    Hemoglobin A1c; Future    CBC and Auto Differential; Future    Urinalysis with Reflex Microscopic; Future    Short of breath on exertion  Entered in error.  Orders:    metoprolol succinate XL (Toprol-XL) 100 mg 24 hr tablet; Take one tablet by mouth once daily    Grieving  He is given few days of Xanax regarding dreaming and is strongly encouraged to follow-up with behavioral health.  Orders:    ALPRAZolam (Xanax) 0.25 mg tablet; Take 1 tablet (0.25 mg) by mouth 3 times a day as needed for anxiety for up to 3 days.    Acquired hypothyroidism  He is advised to decrease levothyroxine 150 mcg in view of suppressed TSH.  Orders:    levothyroxine (Synthroid, Levoxyl) 150 mcg tablet; Take 1 tablet (150 mcg) by mouth early in the morning.. Take on an empty stomach at the same time each day, either 30 to 60 minutes prior to breakfast    TSH; Future    Neck pain, chronic  He will continue tizanidine regarding chronic neck pain.  Orders:    tiZANidine (Zanaflex) 2 mg tablet;  Take 1 tablet (2 mg) by mouth 2 times a day as needed for muscle spasms.

## 2025-06-11 NOTE — ASSESSMENT & PLAN NOTE
He is advised to decrease levothyroxine 150 mcg in view of suppressed TSH.  Orders:    levothyroxine (Synthroid, Levoxyl) 150 mcg tablet; Take 1 tablet (150 mcg) by mouth early in the morning.. Take on an empty stomach at the same time each day, either 30 to 60 minutes prior to breakfast    TSH; Future

## 2025-06-11 NOTE — ASSESSMENT & PLAN NOTE
Patient is strongly advised  to follow-up with behavioral health regarding his major depression and anxiety.  He is given trial with Viibryd regarding depression.  Orders:    Follow Up In Primary Care - Established    vilazodone (Viibryd) 10 mg tablet; Take 1 tablet (10 mg) by mouth once daily.

## 2025-06-16 ASSESSMENT — ENCOUNTER SYMPTOMS
NERVOUS/ANXIOUS: 1
SLEEP DISTURBANCE: 1
HEMATOLOGIC/LYMPHATIC NEGATIVE: 1
CARDIOVASCULAR NEGATIVE: 1
EYES NEGATIVE: 1
MUSCULOSKELETAL NEGATIVE: 1
NEUROLOGICAL NEGATIVE: 1
ENDOCRINE NEGATIVE: 1
ALLERGIC/IMMUNOLOGIC NEGATIVE: 1
CONSTITUTIONAL NEGATIVE: 1
RESPIRATORY NEGATIVE: 1
GASTROINTESTINAL NEGATIVE: 1

## 2025-07-07 ENCOUNTER — APPOINTMENT (OUTPATIENT)
Dept: CARDIOLOGY | Facility: CLINIC | Age: 60
End: 2025-07-07
Payer: COMMERCIAL

## 2025-07-11 ENCOUNTER — APPOINTMENT (OUTPATIENT)
Dept: PHARMACY | Facility: HOSPITAL | Age: 60
End: 2025-07-11
Payer: COMMERCIAL

## 2025-07-11 DIAGNOSIS — E11.9 DIABETES MELLITUS TYPE 2 WITHOUT RETINOPATHY (MULTI): ICD-10-CM

## 2025-07-11 DIAGNOSIS — I10 ESSENTIAL (PRIMARY) HYPERTENSION: ICD-10-CM

## 2025-07-11 DIAGNOSIS — E78.5 DYSLIPIDEMIA: ICD-10-CM

## 2025-07-11 PROCEDURE — RXMED WILLOW AMBULATORY MEDICATION CHARGE

## 2025-07-11 RX ORDER — EZETIMIBE 10 MG/1
10 TABLET ORAL DAILY
Qty: 90 TABLET | Refills: 3 | Status: SHIPPED | OUTPATIENT
Start: 2025-07-11 | End: 2026-07-11

## 2025-07-11 NOTE — PROGRESS NOTES
Clinical Pharmacy Appointment    Patient ID: Maurice Sterling is a 59 y.o. male who presents for Diabetes.    Pt is here for Follow Up appointment.     Referring Provider: Isaias Lorenzo MD    Subjective     Maurice Sterling is a 59 y.o. year old male patient with type two diabetes mellitus, CKD G2/A2, coronary artery disease, class I obesity (BMI 30.92 kg/m2), polysubstance abuse, bipolar disorder and hx of papillary thyroid carcinoma referred for medication management. He is currently prescribed glimepiride 4 mg twice daily, Trulicity 3 mg once weekly, and Farxiga 10 mg once daily in the setting of type two diabetes. He is prescribed lisinopril 40 mg once daily, Metoprolol succinate 100 mg once daily, eplerenone 25 mg daily and hydralazine 50 mg twice daily in the setting of hypertension. He completed repeat laboratory studies on 2025. Repeat hemoglobin A1c improved from 7.8% to 7.4%.  His triglycerides are uncontrolled, >500 mg/dL, and as such was initiated on fenofibrate 145 mg once daily at his initial PCP new patient visit.       DIABETES MELLITUS TYPE 2:    Does patient follow with Endocrinology: No  Does patient follow with Nephrology: No  Does pt have proteinuria? Yes    Breakfast, lunch and dinner are regularly consumed  Generally will begin to consume alcohol, between 2-8 pm   Tobacco Use: None  Alcohol: Mixed drinks (1-3 shots of vodka + orange juice, coffee + 1-3 shots of rum) or 3-4 cans of beer.   Illicit Drug Use: Marijuana use. Less frequent recently. Denies other drug use.     Current diabetic medications include:  Glimepiride 4 mg: Take one tablet by mouth twice daily.  Trulicity 3 mg: Inject 3 mg under the skin once weekly on    Doses remainin  Next injection: 2025  Last injection: 2025   Farxiga 10 mg: Take one tablet by mouth once daily in the morning    Historical DM pharmacotherapy:  Metformin use - Unknown per patient  Xigduo Xr 10/1000 mg - Therapy previously  prescribed    Clarifications to above regimen:   Glimepiride 4 mg - Patient reports once daily administration    Current hypertension medications include:  Metoprolol succinate 100 mg: Take one tablet by mouth once daily  Lisinopril 40 mg: Take one tablet by mouth once daily   Eplerenone 25 mg: Take one tablet by mouth once daily  Hydralazine 50 mg: Take one tablet by mouth twice daily     Clarifications to above regimen:   Therapy stopped with eplerenone by patient due to reported tremors. Suspects that the last dose administered was >6 weeks ago.     Current hyperlipidemia medications include:  Ezetimibe 10 mg: Take one tablet by mouth once daily   Rosuvastatin 20 mg: Take one tablet by mouth once daily   Fenofibrate 145 mg: Take one tablet by mouth once daily    Clarifications to above regimen:   Ezetimibe 10 mg - Not taking  Fenofibrate 145 mg - Suspects that the last dose administered was >6 weeks ago.     Adverse Effects:   Nausea without vomiting or diarrhea. Denies constipation.     Glucose Readings:  Glucometer/CGM Type: None  CGM monitoring supplies - Glucometer, test strips, lancets    Current home BG readings:    No glucose readings available for assessment on 7/11/2025     Glucose 7/11/2025: 200 mg/dL ~20 minutes after eating bologna sandwich + potato chips     Previous home BG readings: None    Any episodes of hypoglycemia? N/A.      Blood pressure Readings:  Monitor: Upper Arm Cuff  Monitoring Frequency: 1-2 times per day x 7 days  Timing of antihypertensive administration: 10 am     Blood pressure unavailable for interpretation     Secondary Prevention:  Statin? Yes  ACE-I/ARB? Yes  Aspirin? Yes    Pertinent PMH Review  PMH of Pancreatitis: No  PMH of Retinopathy: No  PMH of Recurrent Urinary Tract Infections: No   PMH of MTC/MEN type II: No    Medication Reconciliation:  See rooming tab regarding medications not taking/taking    Drug Interactions  The following drug interactions were noted:  Eplerenone + Lisinopril: Monitor potassium, enhanced hyperkalemic effect when agents are combined.    Medication System Management  Patient's preferred pharmacy: [facility]  Adherence/Organization:   AEMR Fill History Reviewed: yes  How often on an average per week does the patient forget to take a dose of their medications?   Do you utilize auto-fill services? No  Do you use a pillbox at home?  yes - Once weekly  Is it weekly or monthly? Weekly   Do you prepare your own pillbox? Yes  Medications that are taking twice daily - AM and 4-6 PM   Affordability/Accessibility:   No reported concerns with cost of medication therapy    Objective   Allergies[1]  Social History     Social History Narrative    Not on file      Medication Review  Current Outpatient Medications   Medication Instructions    ALPRAZolam (XANAX) 0.25 mg, oral, 3 times daily PRN    aspirin 81 mg, oral, Daily    blood sugar diagnostic (OneTouch Ultra Test) strip Use as directed twice daily for glucose monitoring. Test in the morning before breakfast and two hours after dinner.    blood-glucose meter misc Use as directed twice daily for glucose monitoring. Test in the morning before breakfast and two hours after dinner.    busPIRone (BUSPAR) 10 mg, oral, 2 times daily before meals    clopidogrel (PLAVIX) 75 mg, oral, Daily    dapagliflozin propanediol (FARXIGA) 10 mg, oral, Daily    eplerenone (INSPRA) 25 mg, oral, Daily    ezetimibe (ZETIA) 10 mg, oral, Daily    fenofibrate (TRICOR) 145 mg, oral, Daily    glimepiride (AMARYL) 4 mg, oral, 2 times daily, Take 1 tablet by mouth in the morning and 1 tablet in the evening    hydrALAZINE (APRESOLINE) 50 mg, oral, 2 times daily    lancets (OneTouch Delica Plus Lancet) 33 gauge misc Use as directed twice daily for glucose monitoring. Test in the morning before breakfast and two hours after dinner.    levothyroxine (SYNTHROID, LEVOXYL) 150 mcg, oral, Daily, Take on an empty stomach at the same time each  day, either 30 to 60 minutes prior to breakfast    lisinopril 40 mg, oral, Daily    metoprolol succinate XL (Toprol-XL) 100 mg 24 hr tablet Take one tablet by mouth once daily    nitroglycerin (Nitrostat) 0.4 mg SL tablet Place under the tongue.    pantoprazole (PROTONIX) 40 mg, oral, Daily, Do not crush, chew, or split.    rosuvastatin (CRESTOR) 20 mg, oral, Daily    thiamine (VITAMIN B-1) 100 mg, oral, Daily    tiZANidine (ZANAFLEX) 2 mg, oral, 2 times daily PRN    Trulicity 3 mg, subcutaneous, Weekly    vilazodone (VIIBRYD) 10 mg, oral, Daily      Vitals  BP Readings from Last 6 Encounters:   06/11/25 (!) 150/100   04/30/25 (!) 140/98   04/23/25 (!) 160/110   04/07/25 135/83   11/13/24 (!) 152/102   06/28/24 (!) 178/115     BMI Readings from Last 6 Encounters:   06/11/25 30.92 kg/m²   04/30/25 31.46 kg/m²   04/23/25 32.14 kg/m²   04/07/25 31.95 kg/m²   11/13/24 31.46 kg/m²   06/27/24 31.19 kg/m²      Labs  A1C  Lab Results   Component Value Date    HGBA1C 7.4 (H) 06/06/2025    HGBA1C 7.8 (H) 04/21/2025    HGBA1C 8.1 (H) 11/11/2024     BMP  Lab Results   Component Value Date    CALCIUM 9.1 06/06/2025     06/06/2025    K 4.1 06/06/2025    CO2 25 06/06/2025     06/06/2025    BUN 21 06/06/2025    CREATININE 1.02 06/06/2025    EGFR 84 06/06/2025     LFTs  Lab Results   Component Value Date    ALT 49 (H) 06/06/2025    AST 37 (H) 06/06/2025    ALKPHOS 53 06/06/2025    BILITOT 0.6 06/06/2025     FLP  Lab Results   Component Value Date    TRIG 537 (H) 04/21/2025    CHOL 177 04/21/2025    LDLF - 05/09/2023    LDLCALC  04/21/2025      Comment:         LDL cholesterol not calculated. Triglyceride levels  greater than 400 mg/dL invalidate calculated LDL results.           Reference range: <100     Desirable range <100 mg/dL for primary prevention;    <70 mg/dL for patients with CHD or diabetic patients   with > or = 2 CHD risk factors.     LDL-C is now calculated using the Yimi-Bhagat   calculation, which is a  validated novel method providing   better accuracy than the Friedewald equation in the   estimation of LDL-C.   Yimi SS et al. TRANG. 2013;310(19): 2791-4987   (http://education.Guardly.Xcalar/faq/OAD115)      HDL 45 04/21/2025     Urine Microalbumin  Lab Results   Component Value Date    MICROALBCREA 158 (H) 04/21/2025     Weight Management  Wt Readings from Last 3 Encounters:   06/11/25 103 kg (228 lb)   04/30/25 105 kg (232 lb)   04/23/25 108 kg (237 lb)      There is no height or weight on file to calculate BMI.     Assessment/Plan   Problem List Items Addressed This Visit       Diabetes mellitus type 2 without retinopathy (Multi)    Patient's goal A1c is < 7%.  Is pt at goal? No; A1c elevated at 7.4% on 6/6/2025 improved from 7.8%. He does evidence proteinuria (A2) as of 4/21/2025. He is prescribed Trulicity 3 mg once weekly, glimepiride 4 mg twice daily and Farxiga 10 mg once daily     Patient's SMBGs are not available for interpretation. He has not regularly monitored his glucose at home. During our visit he provided a glucose reading of 200 mg/dL approximately 20 minutes after eating a bologna sandwich with potato chips.      Repeat laboratory studies completed on 6/6/2025. His triglycerides are elevated at 537 mg/dL, as such he was prescribed fenofibrate 145 mg once daily at his PCP visit in April 2025. Repeat laboratory studies have not been completed since this initiation. He reports that his last dose of fenofibrate was administered >6 weeks ago and that he is not adherent to prescribed ezetimibe 10 mg. An updated prescription was sent for Ezetimibe 10 mg to his preferred pharmacy. If his triglycerides are unable to be controlled with this combination of therapy then consideration for the use of icosapent ethyl in setting of hypertriglyceridemia, type two diabetes mellitus and secondary prevention would be warranted. His LDL-c is unable to be calculated due to triglycerides >400 mg/dL.     He  reports adherence to his current glycemic regimen including glimepiride 4 mg BID, trulicity 3 mg and farxiga 10 mg. Advised to continue this current regimen today, however consideration for dose increase to Trulicity 4.5 mg is reasonable for next follow up. An updated prescription for the 3 mg dose was sent to maintain continuity of care. Advised to increase his glycemic monitoring to provide for follow up visit. Congratulated on improved glycemic control.     Advised to completed BMP in 7 days and to resume eplerenone 25 mg once daily. He stopped all medications due to tremors that were suspected after initiation of SSRI therapy.     Provide a glucose log and BP log for assessment during [facility] follow up visit.     Monitoring and Education:  Counseled patient on relevant medication mechanisms of action, expectations, side effects, duration of therapy, contraindications, administration, and monitoring parameters.  All questions and concerns addressed. Contact pharmacist with any further questions or concerns prior to next appointment.  Reviewed BG goals: Fasting BG goal , Postprandial BG goal <180 mg/dL, A1C goal <7%%  Reviewed BP goals: <130/<80         Relevant Orders    Basic metabolic panel    Referral to Clinical Pharmacy     Other Visit Diagnoses         Essential (primary) hypertension        Relevant Orders    Basic metabolic panel    Referral to Clinical Pharmacy      Dyslipidemia        Relevant Medications    ezetimibe (Zetia) 10 mg tablet    Other Relevant Orders    Basic metabolic panel    Referral to Clinical Pharmacy          Follow-up: I recommend diabetes care be 7/18/2025 at 1:40 pm.  Nutrition appointment: 7/15/2025  Cardiology Follow-Up: 7/14/2025    Patient is not followed in [facility].    Continue all meds under the continuation of care with the referring provider and clinical pharmacy team.    Thank you,  Kendrick Meyers, PharmD    Clinical Pharmacist  [phone]    Verbal consent to  manage patient's drug therapy was obtained from the patient. They were informed they may decline to participate or withdraw from participation in pharmacy services at any time.         [1]   Allergies  Allergen Reactions    Prednisone Anxiety

## 2025-07-14 ENCOUNTER — OFFICE VISIT (OUTPATIENT)
Dept: CARDIOLOGY | Facility: CLINIC | Age: 60
End: 2025-07-14
Payer: COMMERCIAL

## 2025-07-14 ENCOUNTER — PHARMACY VISIT (OUTPATIENT)
Dept: PHARMACY | Facility: CLINIC | Age: 60
End: 2025-07-14
Payer: MEDICAID

## 2025-07-14 VITALS
WEIGHT: 232 LBS | HEART RATE: 95 BPM | DIASTOLIC BLOOD PRESSURE: 120 MMHG | OXYGEN SATURATION: 93 % | BODY MASS INDEX: 31.42 KG/M2 | HEIGHT: 72 IN | SYSTOLIC BLOOD PRESSURE: 162 MMHG

## 2025-07-14 DIAGNOSIS — N40.1 BENIGN PROSTATIC HYPERPLASIA WITH LOWER URINARY TRACT SYMPTOMS, SYMPTOM DETAILS UNSPECIFIED: Primary | ICD-10-CM

## 2025-07-14 PROCEDURE — 3008F BODY MASS INDEX DOCD: CPT | Performed by: INTERNAL MEDICINE

## 2025-07-14 PROCEDURE — 4010F ACE/ARB THERAPY RXD/TAKEN: CPT | Performed by: INTERNAL MEDICINE

## 2025-07-14 PROCEDURE — 99214 OFFICE O/P EST MOD 30 MIN: CPT | Performed by: INTERNAL MEDICINE

## 2025-07-14 PROCEDURE — 99212 OFFICE O/P EST SF 10 MIN: CPT

## 2025-07-14 PROCEDURE — 1036F TOBACCO NON-USER: CPT | Performed by: INTERNAL MEDICINE

## 2025-07-14 PROCEDURE — 3060F POS MICROALBUMINURIA REV: CPT | Performed by: INTERNAL MEDICINE

## 2025-07-14 PROCEDURE — 3077F SYST BP >= 140 MM HG: CPT | Performed by: INTERNAL MEDICINE

## 2025-07-14 PROCEDURE — 3080F DIAST BP >= 90 MM HG: CPT | Performed by: INTERNAL MEDICINE

## 2025-07-14 ASSESSMENT — PAIN SCALES - GENERAL: PAINLEVEL_OUTOF10: 8

## 2025-07-14 ASSESSMENT — COLUMBIA-SUICIDE SEVERITY RATING SCALE - C-SSRS
6. HAVE YOU EVER DONE ANYTHING, STARTED TO DO ANYTHING, OR PREPARED TO DO ANYTHING TO END YOUR LIFE?: NO
2. HAVE YOU ACTUALLY HAD ANY THOUGHTS OF KILLING YOURSELF?: NO
1. IN THE PAST MONTH, HAVE YOU WISHED YOU WERE DEAD OR WISHED YOU COULD GO TO SLEEP AND NOT WAKE UP?: NO

## 2025-07-14 ASSESSMENT — ENCOUNTER SYMPTOMS
LOSS OF SENSATION IN FEET: 0
OCCASIONAL FEELINGS OF UNSTEADINESS: 0
DEPRESSION: 1

## 2025-07-14 NOTE — ASSESSMENT & PLAN NOTE
Patient's goal A1c is < 7%.  Is pt at goal? No; A1c elevated at 7.4% on 6/6/2025 improved from 7.8%. He does evidence proteinuria (A2) as of 4/21/2025. He is prescribed Trulicity 3 mg once weekly, glimepiride 4 mg twice daily and Farxiga 10 mg once daily     Patient's SMBGs are not available for interpretation. He has not regularly monitored his glucose at home. During our visit he provided a glucose reading of 200 mg/dL approximately 20 minutes after eating a bologna sandwich with potato chips.      Repeat laboratory studies completed on 6/6/2025. His triglycerides are elevated at 537 mg/dL, as such he was prescribed fenofibrate 145 mg once daily at his PCP visit in April 2025. Repeat laboratory studies have not been completed since this initiation. He reports that his last dose of fenofibrate was administered >6 weeks ago and that he is not adherent to prescribed ezetimibe 10 mg. An updated prescription was sent for Ezetimibe 10 mg to his preferred pharmacy. If his triglycerides are unable to be controlled with this combination of therapy then consideration for the use of icosapent ethyl in setting of hypertriglyceridemia, type two diabetes mellitus and secondary prevention would be warranted. His LDL-c is unable to be calculated due to triglycerides >400 mg/dL.     He reports adherence to his current glycemic regimen including glimepiride 4 mg BID, trulicity 3 mg and farxiga 10 mg. Advised to continue this current regimen today, however consideration for dose increase to Trulicity 4.5 mg is reasonable for next follow up. An updated prescription for the 3 mg dose was sent to maintain continuity of care. Advised to increase his glycemic monitoring to provide for follow up visit. Congratulated on improved glycemic control.     Advised to completed BMP in 7 days and to resume eplerenone 25 mg once daily. He stopped all medications due to tremors that were suspected after initiation of SSRI therapy.     Provide a  glucose log and BP log for assessment during [facility] follow up visit.     Monitoring and Education:  Counseled patient on relevant medication mechanisms of action, expectations, side effects, duration of therapy, contraindications, administration, and monitoring parameters.  All questions and concerns addressed. Contact pharmacist with any further questions or concerns prior to next appointment.  Reviewed BG goals: Fasting BG goal , Postprandial BG goal <180 mg/dL, A1C goal <7%%  Reviewed BP goals: <130/<80
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
## 2025-07-15 ENCOUNTER — CLINICAL SUPPORT (OUTPATIENT)
Dept: NUTRITION | Facility: HOSPITAL | Age: 60
End: 2025-07-15
Payer: COMMERCIAL

## 2025-07-15 NOTE — PROGRESS NOTES
[facility]  Diet Recommendation 1: Healthy Eating  Diet Recommendation 2: Diabetes  Diet Recommendation 3: MyPlate  Food Intolerance Avoidance: NKFA  Household Size: 1 Family Member  Interventions: Referral Number: 2nd 6 Mo Referral 1 yr (Referrals may not be consecutive)  Interventions: Visit Number: 6 of 6 Visits - Max 6 Visits/Referral Each 6 Mo Period  Other Interventions: Pt stated starting an appetite suppressent. Pt stated blood sugars and blood pressure have been uncontrolled.  Follow Up Notes for Future Visits: Blood sugars, appetite suppressant  Grains: 0-25% Whole  Fruit: Fresh - 100%  Vegetables: % Fresh  Proteins: 0 Plant-based Items  Dairy: 25-50% Lowfat  Relevant [facility] Inpatient Discharge Items: Messaged provider for new referral  Originating Site of Referral Order: Dr. Isaias Lorenzo  Initials of RD Assisting Today: NB

## 2025-07-18 ENCOUNTER — TELEMEDICINE (OUTPATIENT)
Dept: PHARMACY | Facility: HOSPITAL | Age: 60
End: 2025-07-18
Payer: COMMERCIAL

## 2025-07-18 DIAGNOSIS — I10 BENIGN ESSENTIAL HYPERTENSION: Primary | ICD-10-CM

## 2025-07-18 DIAGNOSIS — E11.9 DIABETES MELLITUS TYPE 2 WITHOUT RETINOPATHY (MULTI): ICD-10-CM

## 2025-07-18 RX ORDER — EPLERENONE 25 MG/1
25 TABLET ORAL DAILY
Qty: 30 TABLET | Refills: 11 | Status: SHIPPED | OUTPATIENT
Start: 2025-07-18 | End: 2026-07-18

## 2025-07-18 NOTE — ASSESSMENT & PLAN NOTE
Patient's goal A1c is < 7%.  Is pt at goal? No; A1c elevated at 7.4% on 6/6/2025 improved from 7.8%. He does evidence proteinuria (A2) as of 4/21/2025. He is prescribed Trulicity 3 mg once weekly, glimepiride 4 mg twice daily and Farxiga 10 mg once daily     Patient's SMBGs are not available for interpretation. He has not regularly monitored his glucose at home despite recommendation to monitor after last visit.     Repeat laboratory studies completed on 6/6/2025. His triglycerides are elevated at 537 mg/dL, as such he was prescribed fenofibrate 145 mg once daily at his PCP visit in April 2025. Repeat laboratory studies have not been completed since this initiation. Ezetimibe 10 mg was resumed after last visit. Fenofibrate was not. If his triglycerides are unable to be controlled with this combination of therapy then consideration for the use of icosapent ethyl in setting of hypertriglyceridemia, type two diabetes mellitus and secondary prevention would be warranted. His LDL-c is unable to be calculated due to triglycerides >400 mg/dL.      He reports adherence to his current glycemic regimen including glimepiride 4 mg BID, trulicity 3 mg and farxiga 10 mg. Advised to continue this current regimen today, however consideration for dose increase to Trulicity 4.5 mg is reasonable for next follow up.       Provide a glucose log and BP log for f/u     Monitoring and Education:  Counseled patient on relevant medication mechanisms of action, expectations, side effects, duration of therapy, contraindications, administration, and monitoring parameters.  All questions and concerns addressed. Contact pharmacist with any further questions or concerns prior to next appointment.  Reviewed BG goals: Fasting BG goal , Postprandial BG goal <180 mg/dL, A1C goal <7%%  Reviewed BP goals: <130/<80

## 2025-07-18 NOTE — ASSESSMENT & PLAN NOTE
Poor medication adherence. Eplerenone 25 mg once daily not resumed after last discussion on 7/11/2025. Current therapy includes lisinopril 40 mg once daily, hydralazine 50 mg twice daily and metoprolol succinate 100 mg once daily with unclear compliance to medication use. Home blood pressure readings unavailable. Last BP in office was 162/120 mmHg and prior office visit reading was 150/100 mmHg.    Advised to continue current therapy. Resume eplerenone 25 mg once daily. Complete BMP in 7 days. Follow up in two weeks with blood pressure readings.

## 2025-07-18 NOTE — PROGRESS NOTES
Clinical Pharmacy Appointment    Patient ID: Maurice Sterling is a 59 y.o. male who presents for Diabetes, Hyperlipidemia, and Hypertension.    Pt is here for Follow Up appointment.     Referring Provider: Isaias Lorenzo MD    Subjective     Maurice Sterling is a 59 y.o. year old male patient with type two diabetes mellitus, CKD G2/A2, coronary artery disease, class I obesity (BMI 30.92 kg/m2), polysubstance abuse, bipolar disorder and hx of papillary thyroid carcinoma referred for medication management. He is currently prescribed glimepiride 4 mg twice daily, Trulicity 3 mg once weekly, and Farxiga 10 mg once daily in the setting of type two diabetes. He is prescribed lisinopril 40 mg once daily, Metoprolol succinate 100 mg once daily, eplerenone 25 mg daily and hydralazine 50 mg twice daily in the setting of hypertension. He completed repeat laboratory studies on 06/06/2025. Repeat hemoglobin A1c improved from 7.8% to 7.4%.  His triglycerides are uncontrolled, >500 mg/dL, and as such was initiated on fenofibrate 145 mg once daily at his initial PCP new patient visit.       DIABETES MELLITUS TYPE 2:    Does patient follow with Endocrinology: No  Does patient follow with Nephrology: No  Does pt have proteinuria? Yes    Lifestyle:  Breakfast, lunch and dinner are regularly consumed  Generally will begin to consume alcohol, between 2-8 pm     Tobacco Use: None  Alcohol: Mixed drinks (1-3 shots of vodka + orange juice, coffee + 1-3 shots of rum) or 3-4 cans of beer.   Illicit Drug Use: Marijuana use. Less frequent recently. Denies other drug use.     Current diabetic medications include:  Glimepiride 4 mg: Take one tablet by mouth twice daily.  Trulicity 3 mg: Inject 3 mg under the skin once weekly on Tuesdays   Doses remaining: 3  Next injection: 7/22/2025  Last injection: 7/15/2025   Farxiga 10 mg: Take one tablet by mouth once daily in the morning    Historical DM pharmacotherapy:  Metformin use - Unknown per  patient  Xigduo Xr 10/1000 mg - Therapy previously prescribed    Clarifications to above regimen:   Glimepiride 4 mg - Patient reports once daily administration    Current hypertension medications include:  Metoprolol succinate 100 mg: Take one tablet by mouth once daily  Lisinopril 40 mg: Take one tablet by mouth once daily   Eplerenone 25 mg: Take one tablet by mouth once daily  Hydralazine 50 mg: Take one tablet by mouth twice daily     Clarifications to above regimen:   Therapy stopped with eplerenone by patient due to reported tremors. Suspects that the last dose administered was >6 weeks ago.     Current hyperlipidemia medications include:  Ezetimibe 10 mg: Take one tablet by mouth once daily   Rosuvastatin 20 mg: Take one tablet by mouth once daily   Fenofibrate 145 mg: Take one tablet by mouth once daily    Clarifications to above regimen:   Ezetimibe 10 mg - Therapy started after last visit on 7/11/2025  Fenofibrate 145 mg - Suspects that the last dose administered was >7 weeks ago.     Adverse Effects:   Nausea without vomiting or diarrhea. Denies constipation.     Glucose Readings:  Glucometer/CGM Type: None  CGM monitoring supplies - Glucometer, test strips, lancets    Current home BG readings:    No glucose readings available for assessment on 7/18/2025     Previous home BG readings:   Glucose 7/11/2025: 200 mg/dL ~20 minutes after eating bologna sandwich + potato chips     Any episodes of hypoglycemia? N/A.      Blood pressure Readings:  Monitor: Upper Arm Cuff  Monitoring Frequency: 1-2 times per day x 7 days  Timing of antihypertensive administration: 10 am     Blood pressure unavailable for interpretation on 7/11/ and 7/18    Secondary Prevention:  Statin? Yes  ACE-I/ARB? Yes  Aspirin? Yes    Pertinent PMH Review  PMH of Pancreatitis: No  PMH of Retinopathy: No  PMH of Recurrent Urinary Tract Infections: No   PMH of MTC/MEN type II: No    Medication Reconciliation:  See rooming tab regarding  medications not taking/taking    Drug Interactions  The following drug interactions were noted: Eplerenone + Lisinopril: Monitor potassium, enhanced hyperkalemic effect when agents are combined.    Medication System Management  Patient's preferred pharmacy: [facility]  Adherence/Organization:   AEMR Fill History Reviewed: yes  How often on an average per week does the patient forget to take a dose of their medications?   Do you utilize auto-fill services? No  Do you use a pillbox at home?  yes - Once weekly  Is it weekly or monthly? Weekly   Do you prepare your own pillbox? Yes  Medications that are taking twice daily - AM and 4-6 PM   Affordability/Accessibility:   No reported concerns with cost of medication therapy    Objective   Allergies[1]  Social History     Social History Narrative    Not on file      Medication Review  Current Outpatient Medications   Medication Instructions    ALPRAZolam (XANAX) 0.25 mg, oral, 3 times daily PRN    aspirin 81 mg, oral, Daily    blood sugar diagnostic (OneTouch Ultra Test) strip Use as directed twice daily for glucose monitoring. Test in the morning before breakfast and two hours after dinner.    blood-glucose meter misc Use as directed twice daily for glucose monitoring. Test in the morning before breakfast and two hours after dinner.    busPIRone (BUSPAR) 10 mg, oral, 2 times daily before meals    clopidogrel (PLAVIX) 75 mg, oral, Daily    dapagliflozin propanediol (FARXIGA) 10 mg, oral, Daily    eplerenone (INSPRA) 25 mg, oral, Daily    ezetimibe (ZETIA) 10 mg, oral, Daily    fenofibrate (TRICOR) 145 mg, oral, Daily    glimepiride (AMARYL) 4 mg, oral, 2 times daily, Take 1 tablet by mouth in the morning and 1 tablet in the evening    hydrALAZINE (APRESOLINE) 50 mg, oral, 2 times daily    lancets (OneTouch Delica Plus Lancet) 33 gauge misc Use as directed twice daily for glucose monitoring. Test in the morning before breakfast and two hours after dinner.     levothyroxine (SYNTHROID, LEVOXYL) 150 mcg, oral, Daily, Take on an empty stomach at the same time each day, either 30 to 60 minutes prior to breakfast    lisinopril 40 mg, oral, Daily    metoprolol succinate XL (Toprol-XL) 100 mg 24 hr tablet Take one tablet by mouth once daily    nitroglycerin (Nitrostat) 0.4 mg SL tablet Place under the tongue.    pantoprazole (PROTONIX) 40 mg, oral, Daily, Do not crush, chew, or split.    rosuvastatin (CRESTOR) 20 mg, oral, Daily    thiamine (VITAMIN B-1) 100 mg, oral, Daily    tiZANidine (ZANAFLEX) 2 mg, oral, 2 times daily PRN    Trulicity 3 mg, subcutaneous, Weekly    vilazodone (VIIBRYD) 10 mg, oral, Daily      Vitals  BP Readings from Last 6 Encounters:   07/14/25 (!) 162/120   06/11/25 (!) 150/100   04/30/25 (!) 140/98   04/23/25 (!) 160/110   04/07/25 135/83   11/13/24 (!) 152/102     BMI Readings from Last 6 Encounters:   07/14/25 31.46 kg/m²   06/11/25 30.92 kg/m²   04/30/25 31.46 kg/m²   04/23/25 32.14 kg/m²   04/07/25 31.95 kg/m²   11/13/24 31.46 kg/m²      Labs  A1C  Lab Results   Component Value Date    HGBA1C 7.4 (H) 06/06/2025    HGBA1C 7.8 (H) 04/21/2025    HGBA1C 8.1 (H) 11/11/2024     BMP  Lab Results   Component Value Date    CALCIUM 9.1 06/06/2025     06/06/2025    K 4.1 06/06/2025    CO2 25 06/06/2025     06/06/2025    BUN 21 06/06/2025    CREATININE 1.02 06/06/2025    EGFR 84 06/06/2025     LFTs  Lab Results   Component Value Date    ALT 49 (H) 06/06/2025    AST 37 (H) 06/06/2025    ALKPHOS 53 06/06/2025    BILITOT 0.6 06/06/2025     FLP  Lab Results   Component Value Date    TRIG 537 (H) 04/21/2025    CHOL 177 04/21/2025    LDLF - 05/09/2023    LDLCALC  04/21/2025      Comment:         LDL cholesterol not calculated. Triglyceride levels  greater than 400 mg/dL invalidate calculated LDL results.           Reference range: <100     Desirable range <100 mg/dL for primary prevention;    <70 mg/dL for patients with CHD or diabetic patients   with >  or = 2 CHD risk factors.     LDL-C is now calculated using the Alistair   calculation, which is a validated novel method providing   better accuracy than the Friedewald equation in the   estimation of LDL-C.   Yimi SCHWARZ et al. TRANG. 2013;310(19): 2897-1365   (http://education.Trippeo/faq/SZS062)      HDL 45 04/21/2025     Urine Microalbumin  Lab Results   Component Value Date    MICROALBCREA 158 (H) 04/21/2025     Weight Management  Wt Readings from Last 3 Encounters:   07/14/25 105 kg (232 lb)   06/11/25 103 kg (228 lb)   04/30/25 105 kg (232 lb)      There is no height or weight on file to calculate BMI.     Assessment/Plan   Problem List Items Addressed This Visit       Diabetes mellitus type 2 without retinopathy (Multi)    Patient's goal A1c is < 7%.  Is pt at goal? No; A1c elevated at 7.4% on 6/6/2025 improved from 7.8%. He does evidence proteinuria (A2) as of 4/21/2025. He is prescribed Trulicity 3 mg once weekly, glimepiride 4 mg twice daily and Farxiga 10 mg once daily     Patient's SMBGs are not available for interpretation. He has not regularly monitored his glucose at home despite recommendation to monitor after last visit.     Repeat laboratory studies completed on 6/6/2025. His triglycerides are elevated at 537 mg/dL, as such he was prescribed fenofibrate 145 mg once daily at his PCP visit in April 2025. Repeat laboratory studies have not been completed since this initiation. Ezetimibe 10 mg was resumed after last visit. Fenofibrate was not. If his triglycerides are unable to be controlled with this combination of therapy then consideration for the use of icosapent ethyl in setting of hypertriglyceridemia, type two diabetes mellitus and secondary prevention would be warranted. His LDL-c is unable to be calculated due to triglycerides >400 mg/dL.      He reports adherence to his current glycemic regimen including glimepiride 4 mg BID, trulicity 3 mg and farxiga 10 mg. Advised to  continue this current regimen today, however consideration for dose increase to Trulicity 4.5 mg is reasonable for next follow up.       Provide a glucose log and BP log for f/u     Monitoring and Education:  Counseled patient on relevant medication mechanisms of action, expectations, side effects, duration of therapy, contraindications, administration, and monitoring parameters.  All questions and concerns addressed. Contact pharmacist with any further questions or concerns prior to next appointment.  Reviewed BG goals: Fasting BG goal , Postprandial BG goal <180 mg/dL, A1C goal <7%%  Reviewed BP goals: <130/<80         Relevant Medications    eplerenone (Inspra) 25 mg tablet    Other Relevant Orders    Referral to Clinical Pharmacy     Follow-up: I recommend diabetes care be 08/01/2025 at 3 pm.  Patient is not followed in [facility].    Continue all meds under the continuation of care with the referring provider and clinical pharmacy team.    Thank you,  Kendrick Meyers, PharmD    Clinical Pharmacist  [phone]    Verbal consent to manage patient's drug therapy was obtained from the patient. They were informed they may decline to participate or withdraw from participation in pharmacy services at any time.         [1]   Allergies  Allergen Reactions    Prednisone Anxiety

## 2025-07-21 SDOH — ECONOMIC STABILITY: FOOD INSECURITY: WITHIN THE PAST 12 MONTHS, YOU WORRIED THAT YOUR FOOD WOULD RUN OUT BEFORE YOU GOT MONEY TO BUY MORE.: OFTEN TRUE

## 2025-07-21 SDOH — ECONOMIC STABILITY: FOOD INSECURITY: WITHIN THE PAST 12 MONTHS, THE FOOD YOU BOUGHT JUST DIDN'T LAST AND YOU DIDN'T HAVE MONEY TO GET MORE.: OFTEN TRUE

## 2025-08-01 ENCOUNTER — APPOINTMENT (OUTPATIENT)
Dept: PHARMACY | Facility: HOSPITAL | Age: 60
End: 2025-08-01
Payer: COMMERCIAL

## 2025-08-01 DIAGNOSIS — E11.10 DM (DIABETES MELLITUS) TYPE 2, UNCONTROLLED, WITH KETOACIDOSIS: ICD-10-CM

## 2025-08-01 DIAGNOSIS — E78.5 DYSLIPIDEMIA: ICD-10-CM

## 2025-08-01 DIAGNOSIS — E11.49 TYPE 2 DIABETES MELLITUS WITH OTHER DIABETIC NEUROLOGICAL COMPLICATION: ICD-10-CM

## 2025-08-01 DIAGNOSIS — E11.9 DIABETES MELLITUS TYPE 2 WITHOUT RETINOPATHY (MULTI): Primary | ICD-10-CM

## 2025-08-01 DIAGNOSIS — I10 BENIGN ESSENTIAL HYPERTENSION: ICD-10-CM

## 2025-08-01 DIAGNOSIS — I10 ESSENTIAL (PRIMARY) HYPERTENSION: ICD-10-CM

## 2025-08-01 DIAGNOSIS — R06.02 SHORT OF BREATH ON EXERTION: ICD-10-CM

## 2025-08-01 PROCEDURE — RXMED WILLOW AMBULATORY MEDICATION CHARGE

## 2025-08-01 RX ORDER — BLOOD-GLUCOSE,RECEIVER,CONT
EACH MISCELLANEOUS
Qty: 1 EACH | Refills: 0 | Status: SHIPPED | OUTPATIENT
Start: 2025-08-01

## 2025-08-01 RX ORDER — DULAGLUTIDE 4.5 MG/.5ML
4.5 INJECTION, SOLUTION SUBCUTANEOUS WEEKLY
Qty: 2 ML | Refills: 11 | Status: SHIPPED | OUTPATIENT
Start: 2025-08-01

## 2025-08-01 RX ORDER — BLOOD-GLUCOSE SENSOR
EACH MISCELLANEOUS
Qty: 2 EACH | Refills: 11 | Status: SHIPPED | OUTPATIENT
Start: 2025-08-01

## 2025-08-01 NOTE — PROGRESS NOTES
[facility]    Clinical Pharmacy Appointment    Patient ID: Maurice Sterling is a 59 y.o. male who presents for Hypertension and Diabetes.    Pt is here for Follow Up appointment.     Referring Provider: Isaias Lorenzo MD    Subjective     Maurice Sterling is a 59 y.o. year old male patient with type two diabetes mellitus, CKD G2/A2, coronary artery disease, class I obesity (BMI 30.92 kg/m2), polysubstance abuse, bipolar disorder and hx of papillary thyroid carcinoma referred for medication management. He is currently prescribed glimepiride 4 mg twice daily, Trulicity 3 mg once weekly, and Farxiga 10 mg once daily in the setting of type two diabetes. He is prescribed lisinopril 40 mg once daily, Metoprolol succinate 100 mg once daily, eplerenone 25 mg daily and hydralazine 50 mg twice daily in the setting of hypertension. He completed repeat laboratory studies on 06/06/2025. Repeat hemoglobin A1c improved from 7.8% to 7.4%.  His triglycerides are uncontrolled, >500 mg/dL, and as such was initiated on fenofibrate 145 mg once daily at his initial PCP new patient visit.       DIABETES MELLITUS TYPE 2:    Does patient follow with Endocrinology: No  Does patient follow with Nephrology: No  Does pt have proteinuria? Yes    Lifestyle:  Breakfast, lunch and dinner are regularly consumed  Generally will begin to consume alcohol, between 2-8 pm   He mentions that he recently got a shut off notice from his electric company - He reports that he does not have difficulty paying, just forgot to check  He notes that he will start to do something (I.e. go to the kitchen, go to the garage and forget that he was cooking)  He has trouble focusing. Some days are better than others.  He has started using free weights that he is using to help stretch his muscles (5 lbs)    Physical activity:   He mentions that he has found recently that he gets short of breath with sweating even walking from the parking lot of a grocery store. He notes that he will  try to find closer parking spots to try to minimize the distance he has to walked.  He states that this is not always recurrent  Tobacco Use: None  Alcohol: Mixed drinks (1-3 shots of vodka + orange juice, coffee + 1-3 shots of rum) or 3-4 cans of beer.   Illicit Drug Use: Marijuana use. Less frequent recently. Denies other drug use.     Current diabetic medications include:  Glimepiride 4 mg: Take one tablet by mouth twice daily.  Trulicity 3 mg: Inject 3 mg under the skin once weekly on    Doses remainin  Next injection: 2025  Last injection: 2025   Farxiga 10 mg: Take one tablet by mouth once daily in the morning    Historical DM pharmacotherapy:  Metformin use - Unknown per patient  Xigduo Xr 10/1000 mg - Therapy previously prescribed    Clarifications to above regimen:   Glimepiride 4 mg - Patient reports once daily administration    Current hypertension medications include:  Metoprolol succinate 100 mg: Take one tablet by mouth once daily  Lisinopril 40 mg: Take one tablet by mouth once daily   Eplerenone 25 mg: Take one tablet by mouth once daily  Hydralazine 50 mg: Take one tablet by mouth twice daily     Clarifications to above regimen:   None    Current hyperlipidemia medications include:  Ezetimibe 10 mg: Take one tablet by mouth once daily   Rosuvastatin 20 mg: Take one tablet by mouth once daily   Fenofibrate 145 mg: Take one tablet by mouth once daily    Clarifications to above regimen:   Ezetimibe 10 mg - Therapy started after last visit on 2025  Fenofibrate 145 mg - Suspects that the last dose administered was >7 weeks ago.     Adverse Effects:   No current reported adverse effects with medication therapy.    Glucose Readings:  Glucometer/CGM Type: None  CGM monitoring supplies - Glucometer, test strips, lancets    Current home BG readings:  Interested in CGM    No glucose readings available for assessment on 2025, 2025    Previous home BG readings:   Glucose  7/11/2025: 200 mg/dL ~20 minutes after eating bologna sandwich + potato chips     Any episodes of hypoglycemia? N/A.      Blood pressure Readings:  Monitor: Upper Arm Cuff  Monitoring Frequency: 1-2 times per day x 7 days  Timing of antihypertensive administration: 10 am     8/1/2025:   Blood pressure readings not available - Needs new batteries    Blood pressure unavailable for interpretation on 7/11/ and 7/18    Secondary Prevention:  Statin? Yes  ACE-I/ARB? Yes  Aspirin? Yes    Pertinent PMH Review  PMH of Pancreatitis: No  PMH of Retinopathy: No  PMH of Recurrent Urinary Tract Infections: No   PMH of MTC/MEN type II: No    Medication Reconciliation:  See rooming tab regarding medications not taking/taking    Drug Interactions  The following drug interactions were noted: Eplerenone + Lisinopril: Monitor potassium, enhanced hyperkalemic effect when agents are combined.    Medication System Management  Patient's preferred pharmacy: [facility]  Adherence/Organization:   AEMR Fill History Reviewed: Yes  How often on an average per week does the patient forget to take a dose of their medications?   Do you utilize auto-fill services? No  Do you use a pillbox at home?  yes - Once weekly  Is it weekly or monthly? Weekly   Do you prepare your own pillbox? Yes  Medications that are taking twice daily - AM and 4-6 PM   Affordability/Accessibility:   No reported concerns with cost of medication therapy  Chase 3 plus - $0  Chase 3 reader - $0  Trulicity 4.5 mg - $0   Farxiga 10 mg once daily - $0    Objective   Allergies[1]  Social History     Social History Narrative    Not on file      Medication Review  Current Outpatient Medications   Medication Instructions    ALPRAZolam (XANAX) 0.25 mg, oral, 3 times daily PRN    aspirin 81 mg, oral, Daily    blood sugar diagnostic (OneTouch Ultra Test) strip Use as directed twice daily for glucose monitoring. Test in the morning before breakfast and two hours after dinner.     blood-glucose meter misc Use as directed twice daily for glucose monitoring. Test in the morning before breakfast and two hours after dinner.    blood-glucose sensor (FreeStyle Chase 3 Plus Sensor) device Apply 1 sensor to the back of the upper arm every 15 days. Change sensor every 15 days.    busPIRone (BUSPAR) 10 mg, oral, 2 times daily before meals    clopidogrel (PLAVIX) 75 mg, oral, Daily    dapagliflozin propanediol (FARXIGA) 10 mg, oral, Daily    eplerenone (INSPRA) 25 mg, oral, Daily    ezetimibe (ZETIA) 10 mg, oral, Daily    fenofibrate (TRICOR) 145 mg, oral, Daily    FreeStyle Chase 3 [place] misc Use as instructed to monitor blood glucose four times daily    glimepiride (AMARYL) 4 mg, oral, 2 times daily, Take 1 tablet by mouth in the morning and 1 tablet in the evening    hydrALAZINE (APRESOLINE) 50 mg, oral, 2 times daily    lancets (OneTouch Delica Plus Lancet) 33 gauge misc Use as directed twice daily for glucose monitoring. Test in the morning before breakfast and two hours after dinner.    levothyroxine (SYNTHROID, LEVOXYL) 150 mcg, oral, Daily, Take on an empty stomach at the same time each day, either 30 to 60 minutes prior to breakfast    lisinopril 40 mg, oral, Daily    metoprolol succinate XL (Toprol-XL) 100 mg 24 hr tablet Take one tablet by mouth once daily    nitroglycerin (Nitrostat) 0.4 mg SL tablet Place under the tongue.    pantoprazole (PROTONIX) 40 mg, oral, Daily, Do not crush, chew, or split.    rosuvastatin (CRESTOR) 20 mg, oral, Daily    thiamine (VITAMIN B-1) 100 mg, oral, Daily    tiZANidine (ZANAFLEX) 2 mg, oral, 2 times daily PRN    Trulicity 4.5 mg, subcutaneous, Weekly    vilazodone (VIIBRYD) 10 mg, oral, Daily      Vitals  BP Readings from Last 6 Encounters:   07/14/25 (!) 162/120   06/11/25 (!) 150/100   04/30/25 (!) 140/98   04/23/25 (!) 160/110   04/07/25 135/83   11/13/24 (!) 152/102     BMI Readings from Last 6 Encounters:   07/14/25 31.46 kg/m²   06/11/25 30.92 kg/m²    04/30/25 31.46 kg/m²   04/23/25 32.14 kg/m²   04/07/25 31.95 kg/m²   11/13/24 31.46 kg/m²      Labs  A1C  Lab Results   Component Value Date    HGBA1C 7.4 (H) 06/06/2025    HGBA1C 7.8 (H) 04/21/2025    HGBA1C 8.1 (H) 11/11/2024     BMP  Lab Results   Component Value Date    CALCIUM 9.1 06/06/2025     06/06/2025    K 4.1 06/06/2025    CO2 25 06/06/2025     06/06/2025    BUN 21 06/06/2025    CREATININE 1.02 06/06/2025    EGFR 84 06/06/2025     LFTs  Lab Results   Component Value Date    ALT 49 (H) 06/06/2025    AST 37 (H) 06/06/2025    ALKPHOS 53 06/06/2025    BILITOT 0.6 06/06/2025     FLP  Lab Results   Component Value Date    TRIG 537 (H) 04/21/2025    CHOL 177 04/21/2025    LDLF - 05/09/2023    LDLCALC  04/21/2025      Comment:         LDL cholesterol not calculated. Triglyceride levels  greater than 400 mg/dL invalidate calculated LDL results.           Reference range: <100     Desirable range <100 mg/dL for primary prevention;    <70 mg/dL for patients with CHD or diabetic patients   with > or = 2 CHD risk factors.     LDL-C is now calculated using the Yimi-Leola   calculation, which is a validated novel method providing   better accuracy than the Friedewald equation in the   estimation of LDL-C.   Yimi SCHWARZ et al. TRANG. 2013;310(19): 3741-0837   (http://education.[employer].Arkadin/faq/ZZW833)      HDL 45 04/21/2025     Urine Microalbumin  Lab Results   Component Value Date    MICROALBCREA 158 (H) 04/21/2025     Weight Management  Wt Readings from Last 3 Encounters:   07/14/25 105 kg (232 lb)   06/11/25 103 kg (228 lb)   04/30/25 105 kg (232 lb)      There is no height or weight on file to calculate BMI.     Assessment/Plan   Problem List Items Addressed This Visit       Diabetes mellitus type 2 without retinopathy (Multi) - Primary    Patient's goal A1c is < 7%.  Is pt at goal? No; A1c elevated at 7.4% on 6/6/2025 improved from 7.8%. He does evidence proteinuria (A2) as of 4/21/2025. He is  prescribed Trulicity 3 mg once weekly, glimepiride 4 mg twice daily and Farxiga 10 mg once daily     Patient's SMBGs are not available for interpretation. He has not regularly monitored his glucose at home despite recommendation to monitor after last visit. He stated that he is interested in continuous glucose monitoring. An updated prescription was sent for Chase 3 to his local pharmacy to utilize the reader device as the .      Repeat laboratory studies completed on 6/6/2025. His triglycerides are elevated at 537 mg/dL, as such he was prescribed fenofibrate 145 mg once daily at his PCP visit in April 2025. Repeat laboratory studies have not been completed since this initiation. Ezetimibe 10 mg was resumed after last visit. Fenofibrate was not. If his triglycerides are unable to be controlled with this combination of therapy then consideration for the use of icosapent ethyl in setting of hypertriglyceridemia, type two diabetes mellitus and secondary prevention would be warranted. His LDL-c is unable to be calculated due to triglycerides >400 mg/dL.      He reports adherence to his current glycemic regimen including glimepiride 4 mg BID, trulicity 3 mg and farxiga 10 mg. An updated prescription was sent for Trulicity 4.5 mg once weekly to maximize glycemic benefits. Advised to complete final injection with Trulicity 3 mg then increase to 4.5 mg once weekly. No additional recommendations at this time.        Monitoring and Education:  Counseled patient on relevant medication mechanisms of action, expectations, side effects, duration of therapy, contraindications, administration, and monitoring parameters.  All questions and concerns addressed. Contact pharmacist with any further questions or concerns prior to next appointment.  Reviewed BG goals: Fasting BG goal , Postprandial BG goal <180 mg/dL, A1C goal <7%%  Reviewed BP goals: <130/<80         Relevant Medications    dulaglutide (Trulicity) 4.5  mg/0.5 mL pen injector    blood-glucose sensor (FreeStyle Chase 3 Plus Sensor) device    FreeStyle Chase 3 [place] misc    Other Relevant Orders    Referral to Clinical Pharmacy    Benign essential hypertension    Poor medication adherence. Eplerenone 25 mg once daily resumed after last discussion on 7/18/2025. Current therapy includes lisinopril 40 mg once daily, hydralazine 50 mg twice daily and metoprolol succinate 100 mg once daily with unclear compliance to medication use. Home blood pressure readings unavailable. Last BP in office was 162/120 mmHg and prior office visit reading was 150/100 mmHg.     Advised to continue current therapy. Complete BMP as soon as possible.          Relevant Orders    Referral to Clinical Pharmacy     Other Visit Diagnoses         Essential (primary) hypertension        Relevant Orders    Referral to Clinical Pharmacy      Dyslipidemia        Relevant Orders    Referral to Clinical Pharmacy      DM (diabetes mellitus) type 2, uncontrolled, with ketoacidosis        Relevant Medications    dulaglutide (Trulicity) 4.5 mg/0.5 mL pen injector    Other Relevant Orders    Referral to Clinical Pharmacy      Short of breath on exertion        Relevant Orders    Referral to Clinical Pharmacy      Type 2 diabetes mellitus with other diabetic neurological complication        Relevant Medications    dulaglutide (Trulicity) 4.5 mg/0.5 mL pen injector    Other Relevant Orders    Referral to Clinical Pharmacy          Follow-up: I recommend diabetes care be 08/29/2025 at 3 pm.  Patient is not followed in [facility].    Continue all meds under the continuation of care with the referring provider and clinical pharmacy team.    Thank you,  Kendrick Meyers, PharmD    Clinical Pharmacist  [phone]    Verbal consent to manage patient's drug therapy was obtained from the patient. They were informed they may decline to participate or withdraw from participation in pharmacy services at any time.       [1]    Allergies  Allergen Reactions    Prednisone Anxiety

## 2025-08-02 ENCOUNTER — PHARMACY VISIT (OUTPATIENT)
Dept: PHARMACY | Facility: CLINIC | Age: 60
End: 2025-08-02
Payer: MEDICAID

## 2025-08-04 NOTE — ASSESSMENT & PLAN NOTE
Poor medication adherence. Eplerenone 25 mg once daily resumed after last discussion on 7/18/2025. Current therapy includes lisinopril 40 mg once daily, hydralazine 50 mg twice daily and metoprolol succinate 100 mg once daily with unclear compliance to medication use. Home blood pressure readings unavailable. Last BP in office was 162/120 mmHg and prior office visit reading was 150/100 mmHg.     Advised to continue current therapy. Complete BMP as soon as possible.

## 2025-08-04 NOTE — ASSESSMENT & PLAN NOTE
Patient's goal A1c is < 7%.  Is pt at goal? No; A1c elevated at 7.4% on 6/6/2025 improved from 7.8%. He does evidence proteinuria (A2) as of 4/21/2025. He is prescribed Trulicity 3 mg once weekly, glimepiride 4 mg twice daily and Farxiga 10 mg once daily     Patient's SMBGs are not available for interpretation. He has not regularly monitored his glucose at home despite recommendation to monitor after last visit. He stated that he is interested in continuous glucose monitoring. An updated prescription was sent for Chase 3 to his local pharmacy to utilize the reader device as the .      Repeat laboratory studies completed on 6/6/2025. His triglycerides are elevated at 537 mg/dL, as such he was prescribed fenofibrate 145 mg once daily at his PCP visit in April 2025. Repeat laboratory studies have not been completed since this initiation. Ezetimibe 10 mg was resumed after last visit. Fenofibrate was not. If his triglycerides are unable to be controlled with this combination of therapy then consideration for the use of icosapent ethyl in setting of hypertriglyceridemia, type two diabetes mellitus and secondary prevention would be warranted. His LDL-c is unable to be calculated due to triglycerides >400 mg/dL.      He reports adherence to his current glycemic regimen including glimepiride 4 mg BID, trulicity 3 mg and farxiga 10 mg. An updated prescription was sent for Trulicity 4.5 mg once weekly to maximize glycemic benefits. Advised to complete final injection with Trulicity 3 mg then increase to 4.5 mg once weekly. No additional recommendations at this time.        Monitoring and Education:  Counseled patient on relevant medication mechanisms of action, expectations, side effects, duration of therapy, contraindications, administration, and monitoring parameters.  All questions and concerns addressed. Contact pharmacist with any further questions or concerns prior to next appointment.  Reviewed BG goals: Fasting  BG goal , Postprandial BG goal <180 mg/dL, A1C goal <7%%  Reviewed BP goals: <130/<80

## 2025-08-13 ENCOUNTER — CLINICAL SUPPORT (OUTPATIENT)
Dept: NUTRITION | Facility: HOSPITAL | Age: 60
End: 2025-08-13
Payer: COMMERCIAL

## 2025-08-29 ENCOUNTER — APPOINTMENT (OUTPATIENT)
Dept: PHARMACY | Facility: HOSPITAL | Age: 60
End: 2025-08-29
Payer: COMMERCIAL

## 2025-09-02 ENCOUNTER — HOSPITAL ENCOUNTER (OUTPATIENT)
Dept: RADIOLOGY | Facility: CLINIC | Age: 60
Discharge: HOME | End: 2025-09-02
Payer: COMMERCIAL

## 2025-09-02 DIAGNOSIS — I10 ESSENTIAL (PRIMARY) HYPERTENSION: ICD-10-CM

## 2025-09-02 PROCEDURE — 76770 US EXAM ABDO BACK WALL COMP: CPT

## 2025-09-02 PROCEDURE — 76770 US EXAM ABDO BACK WALL COMP: CPT | Performed by: RADIOLOGY

## 2025-09-12 ENCOUNTER — APPOINTMENT (OUTPATIENT)
Dept: PRIMARY CARE | Facility: CLINIC | Age: 60
End: 2025-09-12
Payer: COMMERCIAL

## 2025-09-26 ENCOUNTER — APPOINTMENT (OUTPATIENT)
Dept: PHARMACY | Facility: HOSPITAL | Age: 60
End: 2025-09-26
Payer: COMMERCIAL